# Patient Record
Sex: FEMALE | Race: WHITE
[De-identification: names, ages, dates, MRNs, and addresses within clinical notes are randomized per-mention and may not be internally consistent; named-entity substitution may affect disease eponyms.]

---

## 2018-07-10 ENCOUNTER — HOSPITAL ENCOUNTER (EMERGENCY)
Dept: HOSPITAL 75 - ER | Age: 58
Discharge: HOME | End: 2018-07-10
Payer: COMMERCIAL

## 2018-07-10 VITALS — BODY MASS INDEX: 29.92 KG/M2 | WEIGHT: 202 LBS | HEIGHT: 69 IN

## 2018-07-10 VITALS — DIASTOLIC BLOOD PRESSURE: 113 MMHG | SYSTOLIC BLOOD PRESSURE: 155 MMHG

## 2018-07-10 DIAGNOSIS — S90.02XA: Primary | ICD-10-CM
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
## 2018-07-10 PROCEDURE — 73610 X-RAY EXAM OF ANKLE: CPT

## 2018-07-10 NOTE — XMS REPORT
[facility]

 Created on: 10/19/2016



Evelin Rahman

External Reference #: [id number]

: 1960

Sex: Female



Demographics







 Address  [address]  [address]

 

 Home Phone  [phone]

 

 Preferred Language  Unknown

 

 Marital Status  Unknown

 

 Worship Affiliation  Unknown

 

 Race  White

 

 Ethnic Group  Not  or 





Author







 NIKKI Puga

 

 Organization  [employer]

 

 Address  Unknown

 

 Phone  Unavailable







Care Team Providers







 Care Team Member Name  Role  Phone

 

 NIKKI MAYERS  CP  Unavailable



                                                                



Allergies

          No Known Allergies                                                   
                                     



Problems

          





 Problem Type  Condition  Code  Onset Dates  Condition Status

 

 Problem  ADHD (attention deficit hyperactivity disorder)  F90.9     Active

 

 Problem  Anxiety  F41.9     Active

 

 Problem  Depression  F32.9     Active

 

 Problem  GERD (gastroesophageal reflux disease)  K21.9     Active

 

 Problem  Birth control  Z30.9     Active



                                                                               
                                                 



Medications

          No Known Medications                                                 
                             



Results

          No Known Results                                                     
               



Summary Purpose

          [employer] Submission

## 2018-07-10 NOTE — XMS REPORT
Continuity of Care Document

 Created on: 07/10/2018



MIKAEL SCHWARTZ

External Reference #: [id number]

: 1960

Sex: Female



Demographics







 Address  [address] [place] [address]

 

 Home Phone  [phone] x

 

 Preferred Language  Unknown

 

 Marital Status  Unknown

 

 Tenriism Affiliation  Unknown

 

 Race  Unknown

 

 Ethnic Group  Unknown





Author







 Author  [facility]

 

 Address  Unknown

 

 Phone  Unavailable



              



Allergies

      



There is no data.                  



Medications

      



There is no data.                  



Problems

      





 Date Dx Coded            Attending            Type            Code            
Diagnosis            Diagnosed By        

 

 2014            ELLIOTT GODFREY                         300.00 
           AN ANXIETY UNSPEC                     

 

 2014            ELLIOTT GODFREY                         311    
        DEPRESSIVE DISORDER NOS                     

 

 2014            ELLIOTT GODFREY                         314.00 
           ADHD INATTENTIVE                     

 

 2014            KANE SAEZ                         300.00  
          AN ANXIETY UNSPEC                     

 

 2014            KANE SAEZ                         311     
       DEPRESSIVE DISORDER NOS                     

 

 2014            KANE SAEZ                         314.00  
          ADHD INATTENTIVE                     

 

 2014            KANE SAEZ                         300.00  
          AN ANXIETY UNSPEC                     

 

 2014            KANE SAEZ                         311     
       DEPRESSIVE DISORDER NOS                     

 

 2014            KANE SAEZ                         314.00  
          ADHD INATTENTIVE                     

 

 2014            JACKELINE MEADOWS DDS                         300.00     
       AN ANXIETY UNSPEC                     

 

 2014            MEADOWS DDS, JACKELINE                         311        
    DEPRESSIVE DISORDER NOS                     

 

 2014            MEADOWS JANELLES, JACKELINE                         314.00     
       ADHD INATTENTIVE                     

 

 2014            FARRUKH HARP MAURO R                         300.00    
        AN ANXIETY UNSPEC                     

 

 2014            FARRUKH HARP MAURO R                         311       
     DEPRESSIVE DISORDER NOS                     

 

 2014            FARRUKH HARP MAURO R                         314.00    
        ADHD INATTENTIVE                     

 

 2014            KANE SAEZ                         296.33  
          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                
     

 

 2014            KANE SAEZ                         309.81  
          AN PTSD                     

 

 2014            KANE SAEZ                         296.33  
          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                
     

 

 2014            KANE SAEZ                         309.81  
          AN PTSD                     

 

 2014            JACKELINE MEADOWS DDS                         296.33     
       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                   
  

 

 2014            DORI PRESCOTTJACKELINE                         309.81     
       AN PTSD                     

 

 2014            MAURO SANTIAGO                         296.33    
        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                  
   

 

 2014            MAURO SANTIAGO                         309.81    
        AN PTSD                     

 

 2015            MAURO SANTIAGO                         530.81    
        GERD                     

 

 2015            MAURO SANTIAGO                         V70.0     
       ROUTINE GENERAL MEDICAL EXAMINATION AT A HEALTH CARE FACILITY           
          



                                                                  



Procedures

      



There is no data.                  



Results

      





 Test            Result            Range        









 PDM - AMPHETAMINES W/ REFLEX d/l ISOMERS - 18 11:20         









 Prescribed Drug 1            Adderall(TM)             NRG        

 

 COMMENT                         NRG        

 

 Amphetamine            2557 ng/mL            <250        

 

 medMATCH Amphetamine            CONSISTENT             NRG        

 

 Methamphetamine            NEGATIVE ng/mL            <250        

 

 medMATCH Methamphetamine            CONSISTENT             NRG        



                



Encounters

      





 ACCT No.            Visit Date/Time            Discharge            Status    
        Pt. Type            Provider            Facility            Loc./Unit  
          Complaint        

 

 [id number]            2015 09:53:00            2015 23:59:59          
  CLS            Outpatient            MAURO SANTIAGO                   
                            

 

 [id number]            2015 07:58:00            2015 23:59:59          
  CLS            Outpatient            DORI LUISJACKELINE STARKEY                    
                           

 

 [id number]            2014 11:48:00            2014 23:59:59          
  CLS            Outpatient            KANE SAEZ                 
                              

 

 [id number]            2014 11:48:00            2014 23:59:59          
  CLS            Outpatient            KANE SAEZ                 
                              

 

 [id number]            2014 13:45:00            2014 23:59:59          
  CLS            Outpatient            ELLIOTT GODFREY                
                               

 

 [id number]            2018 09:20:00            2018 23:59:59            
CLS            Outpatient            JAME VAIL LAC                         
[facility]                     

 

 [id number]            2018 10:40:00                                      
Document Registration                                                          
  

 

 [id number]            2017 23:44:33            2017 23:59:59            
CLS            Outpatient

## 2018-07-10 NOTE — XMS REPORT
[facility]

 Created on: 2017



Elaine Rahmana

External Reference #: [id number]

: 1960

Sex: Female



Demographics







 Address  [address]  [address]

 

 Preferred Language  Unknown

 

 Marital Status  Unknown

 

 Scientology Affiliation  Unknown

 

 Race  Unknown

 

 Ethnic Group  Unknown





Author







 Author  NIKKI MAYERS

 

 [facility]

 

 Address  [address]  [place]  [address]



 

 Phone  [phone]







Care Team Providers







 Care Team Member Name  Role  Phone

 

 NIKKI MAYERS  Unavailable  [phone]







PROBLEMS







 Type  Condition  ICD9-CM Code  ESC34-WA Code  Onset Dates  Condition Status  
SNOMED Code

 

 Problem  Depression     F32.9     Active  67457114

 

 Problem  ADHD (attention deficit hyperactivity disorder)     F90.9     Active  
679407313

 

 Problem  Birth control     Z30.9     Active  70879034

 

 Problem  Anxiety     F41.9     Active  87427342

 

 Problem  GERD (gastroesophageal reflux disease)     K21.9     Active  290635009







ALLERGIES

Unknown Allergies



SOCIAL HISTORY

No smoking Hx information available



PLAN OF CARE





VITAL SIGNS





MEDICATIONS

Unknown Medications



RESULTS

No Results



PROCEDURES

No Known procedures



IMMUNIZATIONS

No Known Immunizations

## 2018-07-10 NOTE — XMS REPORT
[facility]

 Created on: 2017



Elaine Rahmana

External Reference #: [id number]

: 1960

Sex: Female



Demographics







 Address  [address]  [address]

 

 Preferred Language  Unknown

 

 Marital Status  Unknown

 

 Religion Affiliation  Unknown

 

 Race  Unknown

 

 Ethnic Group  Unknown





Author







 Author  NIKKI MAYERS

 

 [facility]

 

 Address  [address]  [place]  [address]



 

 Phone  [phone]







Care Team Providers







 Care Team Member Name  Role  Phone

 

 NIKKI MAYERS  Unavailable  [phone]







PROBLEMS







 Type  Condition  ICD9-CM Code  PLF80-UO Code  Onset Dates  Condition Status  
SNOMED Code

 

 Problem  Depression     F32.9     Active  26119931

 

 Problem  ADHD (attention deficit hyperactivity disorder)     F90.9     Active  
151656922

 

 Problem  Birth control     Z30.9     Active  05632073

 

 Problem  Anxiety     F41.9     Active  88123913

 

 Problem  GERD (gastroesophageal reflux disease)     K21.9     Active  593079946







ALLERGIES

Unknown Allergies



SOCIAL HISTORY

No smoking Hx information available



PLAN OF CARE





VITAL SIGNS





MEDICATIONS

Unknown Medications



RESULTS

No Results



PROCEDURES

No Known procedures



IMMUNIZATIONS

No Known Immunizations

## 2018-07-10 NOTE — XMS REPORT
[facility]

 Created on: 2017



Evelin Rahman

External Reference #: [id number]

: 1960

Sex: Female



Demographics







 Address  [address]  [address]

 

 Preferred Language  Unknown

 

 Marital Status  Unknown

 

 Buddhist Affiliation  Unknown

 

 Race  Unknown

 

 Ethnic Group  Unknown





Author







 Author  KANE THOMPSON

 

 Organization  [facility]

 

 Address  [address] [place]  [address]



 

 Phone  [phone]







Care Team Providers







 Care Team Member Name  Role  Phone

 

 KANE THOMPSON  Unavailable  [phone]







PROBLEMS







 Type  Condition  ICD9-CM Code  SDW91-JW Code  Onset Dates  Condition Status  
SNOMED Code

 

 Problem  ADHD (attention deficit hyperactivity disorder)     F90.9     Active  
532162696

 

 Problem  GERD (gastroesophageal reflux disease)     K21.9     Active  004136132

 

 Problem  Depression     F32.9     Active  37377147

 

 Problem  Birth control     Z30.9     Active  73760642

 

 Problem  SUMAYA (generalized anxiety disorder)     F41.1     Active  96840937

 

 Problem  Chronic post-traumatic stress disorder (PTSD)     F43.12     Active  
107536011

 

 Problem  PTSD (post-traumatic stress disorder)     F43.10     Active  07409158

 

 Problem  Anxiety     F41.9     Active  44158111

 

 Problem  Recurrent major depressive disorder, in partial remission     F33.41 
    Active  43208505

 

 Problem  Major depression, chronic     F32.9     Active  991754230







ALLERGIES

No Information



SOCIAL HISTORY

Never Assessed



PLAN OF CARE





VITAL SIGNS





MEDICATIONS







 Medication  Instructions  Dosage  Frequency  Start Date  End Date  Duration  
Status

 

 Adderall XR 10 mg  Orally Once at 2pm for ADHD  1 capsule     28 Mar, 2017     
28 days  Active

 

 Trazodone HCl 300 MG  Orally Once a day  1 tablet at bedtime  24h  27 Mar, 
2017     30 day(s)  Active

 

 BusPIRone HCl 15 MG  Orally Twice a day  1 tablet  12h  27 Mar, 2017     30 
days  Active

 

 Adderall XR 20 mg  Orally Once a day for ADHD  1 Capsule     28 Mar, 2017     
28 days  Active







RESULTS

No Results



PROCEDURES

No Known procedures



IMMUNIZATIONS

No Known Immunizations



MEDICAL (GENERAL) HISTORY







 Type  Description  Date

 

 Medical History  gastrointestinal disorder Hiatal Hernia   

 

 Medical History  Psychiatric disorders Depression , anxiety   

 

 Medical History  ADHD   

 

 Medical History  Cervical CA-with cone biopsy    

 

 Surgical History  sinus  surgery  

 

 Hospitalization History  Childhood panic - hospitalized for heart eval

## 2018-07-10 NOTE — XMS REPORT
[facility]

 Created on: 2018



Evelin Rahman

External Reference #: [id number]

: 1960

Sex: Female



Demographics







 Address  [address] [place] [place]  [address]

 

 Preferred Language  Unknown

 

 Marital Status  Unknown

 

 Nondenominational Affiliation  Unknown

 

 Race  Unknown

 

 Ethnic Group  Unknown





Author







 Author  KANE THOMPSON

 

 [facility]

 

 Address  [address] [place]  [address]



 

 Phone  [phone]







Care Team Providers







 Care Team Member Name  Role  Phone

 

 KANE THOMPSON  Unavailable  [phone]







PROBLEMS







 Type  Condition  ICD9-CM Code  TVZ61-OT Code  Onset Dates  Condition Status  
SNOMED Code

 

 Problem  GERD (gastroesophageal reflux disease)     K21.9     Active  065562009

 

 Problem  PTSD (post-traumatic stress disorder)     F43.10     Active  46923198

 

 Problem  Anxiety     F41.9     Active  88173372

 

 Problem  Birth control     Z30.9     Active  40689220

 

 Problem  ADHD (attention deficit hyperactivity disorder)     F90.9     Active  
132306409

 

 Problem  Depression     F32.9     Active  07652666

 

 Problem  History of fatty infiltration of liver     Z87.19     Active  
089154429

 

 Problem  ADHD (attention deficit hyperactivity disorder), inattentive type     
F90.0     Active  57397803

 

 Problem  Recurrent major depressive disorder, in partial remission     F33.41 
    Active  85327300

 

 Problem  Major depression, chronic     F32.9     Active  162391929

 

 Problem  Chronic post-traumatic stress disorder (PTSD)     F43.12     Active  
378882533

 

 Problem  SUMAYA (generalized anxiety disorder)     F41.1     Active  36005528







ALLERGIES

No Known Allergies



ENCOUNTERS







 Encounter  Location  Date  Diagnosis

 

 [facility]  [id number] [address]     

 

 [facility]  [id number], [place] [address]
7791     

 

 [facility]  [address] [place] [address]
3440  22 Mar, 2018   

 

 [facility]  [address] [place] [address]-
6384  08 Mar, 2018  GERD (gastroesophageal reflux disease) K21.9 ; History of 
fatty infiltration of liver Z87.19 ; Family history of heart disease Z82.49 ; 
Routine adult health maintenance Z00.00 and Encounter for screening for 
malignant neoplasm of colon Z12.11

 

 [facility]  [id number] [address]
8256  07 Mar, 2018  Acute nasopharyngitis J00

 

 [facility]  [id number] [place] [address]
3666  28 2018   

 

 [facility]  [address] [address]     

 

 [facility]  [id number] [address]
3480    Recurrent major depressive disorder, in partial remission 
F33.41 ; ADHD (attention deficit hyperactivity disorder), inattentive type 
F90.0 and PTSD (post-traumatic stress disorder) F43.10

 

 [facility]  [id number] [address]
0996  05 Dec, 2017   

 

 [facility]  [address] [address]
12[address]     

 

 [facility]  [id number] [address]  12 Oct, 2017  Recurrent major depressive disorder, in partial remission 
F33.41 ; PTSD (post-traumatic stress disorder) F43.10 and ADHD (attention 
deficit hyperactivity disorder), inattentive type F90.0

 

 [facility]  [id number] [address]-
1708  13 Sep, 2017   

 

 [facility]  [id number] [address]
8607  11 Aug, 2017   

 

 [facility]  [id number] [address]676  14 2017   

 

 [facility]  [id number] [address]     

 

 [facility]  [id number] [address]
4646    ADHD (attention deficit hyperactivity disorder) F90.9

 

 [facility]  [address] [address]
4304    Recurrent major depressive disorder, in partial remission 
F33.41 ; SUMAYA (generalized anxiety disorder) F41.1 ; Chronic post-traumatic 
stress disorder (PTSD) F43.12 and ADHD (attention deficit hyperactivity disorder
) F90.9

 

 [facility]  [address] [place] [address]
5[address]  24 May, 2017   

 

 [facility]  [id number] [place] [address]
3476  19 May, 2017   

 

 [facility]  [id number] [place] [address]-
[address]     

 

 [facility]  [id number] [place] [address]-
9956  30 Mar, 2017   

 

 [facility]  [id number] [place] [address]-
7446  28 Mar, 2017   

 

 [facility]  [id number] [place] [address]-
5408  23 Mar, 2017   

 

 [facility]  [address] [place] [address]-
1830     

 

 [facility]  [id number] [address]-
7766     

 

 [facility]  [address] [address]-
5176    Recurrent major depressive disorder, in partial remission 
F33.41 ; PTSD (post-traumatic stress disorder) F43.10 and ADHD (attention 
deficit hyperactivity disorder) F90.9

 

 [facility]  [address] [place] [address]-
3656  31 Oct, 2016   

 

 [facility]  [id number] [place] [address]-
0956  31 Oct, 2016   

 

 [facility]  [id number] [address]-
4443  27 Oct, 2016   

 

 [facility]  [address] [address]-
6880  25 Oct, 2016   

 

 [facility]  [address] [address]-
8827  17 Oct, 2016   

 

 [facility]  [address] [address]-
5026  08 Sep, 2016  Bronchitis J40

 

 [facility]  [id number] [place] [address]-
7933  08 Sep, 2016   

 

 [facility]  [address] [address]-
5432  08 Sep, 2016   

 

 [facility]  [address] [place] [address]-
1327  12 May, 2016   

 

 [facility]  [address] [address]-
1029  04 May, 2016  GERD (gastroesophageal reflux disease) K21.9 and Birth 
control Z30.9

 

 [facility]  [address] [address]
5398    Routine gynecological examination V72.31 ; Pap test, as 
part of routine gynecological examination V76.2 ; Screen for STD (sexually 
transmitted disease) V74.5 ; Breast cancer screening V76.10 ; Colon cancer 
screening V76.51 and Oral contraceptive pill surveillance V25.41

 

 [facility]  [address] [address]-
1864     

 

 [facility]  [address] [address]-
7920     

 

 [facility]  [address] [address]-
6492  30 Dec, 2014   

 

 [facility]  [address] [address]-
6305  30 Dec, 2014   

 

 [facility]  [address] [address]-
6371     

 

 [facility]  [id number] [address]-
8747     

 

 [facility]  [address] [address]-
[address]     

 

 [facility]  [address] [address]-
2449     

 

 [facility]  [address] [address]-
3466     

 

 [facility]  [address] [address]-
5976     

 

 [facility]  [address] [address]-
4163  30 Sep, 2014   

 

 [facility]  [address] [address]-
4920  30 Sep, 2014   







IMMUNIZATIONS

No Known Immunizations



SOCIAL HISTORY

Never Assessed



REASON FOR VISIT

 f/isaias Bhardwaj MA



PLAN OF CARE







 Activity  Details









  









 Follow Up  4 Months Reason:







VITAL SIGNS







 Height  69 in  2017

 

 Weight  196.1 lbs  2017

 

 Heart Rate  88 bpm  2017

 

 Respiratory Rate  20   2017

 

 BMI  28.96 kg/m2  2017

 

 Blood pressure systolic  170 mmHg  2017

 

 Blood pressure diastolic  91 mmHg  2017







MEDICATIONS







 Medication  Instructions  Dosage  Frequency  Start Date  End Date  Duration  
Status

 

 Fluoxetine 40 mg  by oral route each morning  2 Capsule           
  Active

 

 Trazodone HCl 300 MG  Orally Once a day  1 tablet at bedtime  24h  27 Mar, 
2017        Active

 

 Strattera 100 mg  Orally Once each morning  1 capsule     30 Sep, 2014        
Active

 

 Adderall XR 10 mg  Orally Once at 2pm for ADHD  1 capsule     25 May, 2017    
    Active

 

 BusPIRone HCl 15 mg  Orally Twice a day  1 tablet  12h  27 Mar, 2017        
Active

 

 Effexor  MG  Orally Once a day  2 capsule with food  24h  30 Mar, 2017  
      Active

 

 Adderall XR 20 mg  Orally Once a day for ADHD  1 Capsule     25 May, 2017     
   Active

 

 Junel FE  1 mg-20 mcg  Orally Once a day  TAKE ONE TABLET BY MOUTH DAILY  
24h        28  Active

 

 Nexium 40 mg  Orally Once a day  1 capsule  24h  04 May, 2016     30 day(s)  
Active







RESULTS

No Results



PROCEDURES

No Known procedures



INSTRUCTIONS





MEDICATIONS ADMINISTERED

No Known Medications



MEDICAL (GENERAL) HISTORY







 Type  Description  Date

 

 Medical History  gastrointestinal disorder Hiatal Hernia   

 

 Medical History  Psychiatric disorders Depression , anxiety   

 

 Medical History  ADHD   

 

 Medical History  Cervical CA-with cone biopsy    

 

 Surgical History  sinus  surgery  

 

 Hospitalization History  Childhood panic - hospitalized for heart eval  1971

## 2018-07-10 NOTE — XMS REPORT
[facility]

 Created on: 2018



Evelin Rahman

External Reference #: [id number]

: 1960

Sex: Female



Demographics







 Address  [address] [place]  [address]

 

 Preferred Language  Unknown

 

 Marital Status  Unknown

 

 Taoist Affiliation  Unknown

 

 Race  Unknown

 

 Ethnic Group  Unknown





Author







 Author  KANE THOMPSON

 

 [facility]

 

 Address  [address] [place]  [address]



 

 Phone  [phone]







Care Team Providers







 Care Team Member Name  Role  Phone

 

 KANE THOMPSON  Unavailable  [phone]







PROBLEMS







 Type  Condition  ICD9-CM Code  KYU20-LL Code  Onset Dates  Condition Status  
SNOMED Code

 

 Problem  GERD (gastroesophageal reflux disease)     K21.9     Active  749698664

 

 Problem  PTSD (post-traumatic stress disorder)     F43.10     Active  50147309

 

 Problem  Anxiety     F41.9     Active  76685595

 

 Problem  Birth control     Z30.9     Active  62904153

 

 Problem  ADHD (attention deficit hyperactivity disorder)     F90.9     Active  
468855386

 

 Problem  Depression     F32.9     Active  16950442

 

 Problem  History of fatty infiltration of liver     Z87.19     Active  
163564586

 

 Problem  ADHD (attention deficit hyperactivity disorder), inattentive type     
F90.0     Active  47135697

 

 Problem  Recurrent major depressive disorder, in partial remission     F33.41 
    Active  75137541

 

 Problem  Major depression, chronic     F32.9     Active  428425599

 

 Problem  Chronic post-traumatic stress disorder (PTSD)     F43.12     Active  
663804045

 

 Problem  SUMAYA (generalized anxiety disorder)     F41.1     Active  42903173







ALLERGIES

No Information



ENCOUNTERS







 Encounter  Location  Date  Diagnosis

 

 [facility]  [id number], [place] [address]
6783     

 

 [facility]  [address], [place] [address]
0684     

 

 [facility]  [address] [place] [address]-
6780  22 Mar, 2018   

 

 [facility]  [address] [place] [address]-
0426  08 Mar, 2018  GERD (gastroesophageal reflux disease) K21.9 ; History of 
fatty infiltration of liver Z87.19 ; Family history of heart disease Z82.49 ; 
Routine adult health maintenance Z00.00 and Encounter for screening for 
malignant neoplasm of colon Z12.11

 

 [facility]  [address], [place] [address]
8042  07 Mar, 2018  Acute nasopharyngitis J00

 

 [facility]  [id number], [place] [address]
0336  28 2018   

 

 [facility]  [address], [place] [address]
4473     

 

 [facility]  [address] [address]
7651    Recurrent major depressive disorder, in partial remission 
F33.41 ; ADHD (attention deficit hyperactivity disorder), inattentive type 
F90.0 and PTSD (post-traumatic stress disorder) F43.10

 

 [facility]  [address], [place] [address]
4386  05 Dec, 2017   

 

 [facility]  [id number] [address]
7518     

 

 [facility]  [address] [address]
1110  12 Oct, 2017  Recurrent major depressive disorder, in partial remission 
F33.41 ; PTSD (post-traumatic stress disorder) F43.10 and ADHD (attention 
deficit hyperactivity disorder), inattentive type F90.0

 

 [facility]  [address] [address]-
4099  13 Sep, 2017   

 

 [facility]  [address] [address]-
4333  11 Aug, 2017   

 

 [facility]  [id number] [address]-
2190  14 2017   

 

 [facility]  [address] [address]-
2626  16 2017   

 

 [facility]  [id number] [address]-
4646    ADHD (attention deficit hyperactivity disorder) F90.9

 

 [facility]  [address] [address]
5480    Recurrent major depressive disorder, in partial remission 
F33.41 ; SUMAYA (generalized anxiety disorder) F41.1 ; Chronic post-traumatic 
stress disorder (PTSD) F43.12 and ADHD (attention deficit hyperactivity disorder
) F90.9

 

 [facility]  [address] [place] [facility] [address]-
7456  24 May, 2017   

 

 [facility]  [id number] [place] [address]-
9536  19 May, 2017   

 

 [facility]  [id number] [place] [address], [place] [address]-
5[address]     

 

 [facility]  [id number] [place] [address], [place] [address]-
8056  30 Mar, 2017   

 

 [facility]  [id number] [place] [address]-
8553  28 Mar, 2017   

 

 [facility]  [id number] [address]-
7694  23 Mar, 2017   

 

 [facility]  [id number] [place] [address], [place] [address]-
0691     

 

 [facility]  [id number] [address]-
4493     

 

 [facility]  [id number] [address]-
7441    Recurrent major depressive disorder, in partial remission 
F33.41 ; PTSD (post-traumatic stress disorder) F43.10 and ADHD (attention 
deficit hyperactivity disorder) F90.9

 

 [facility]  [id number] [address]-
6966  31 Oct, 2016   

 

 [facility]  [id number] [place] [address]-
6716  31 Oct, 2016   

 

 [facility]  [id number] [address]-
1164  27 Oct, 2016   

 

 [facility]  [address], [place] [address]-
6537  25 Oct, 2016   

 

 [facility]  [address] [address]-
0377  17 Oct, 2016   

 

 [facility]  [address], [place] [address]-
2266  08 Sep, 2016  Bronchitis J40

 

 [facility]  [id number] [place] [address]-
2186  08 Sep, 2016   

 

 [facility]  [address] [place] [address]-
1848  08 Sep, 2016   

 

 [facility]  [address] [place] [address]-
2581  12 May, 2016   

 

 [facility]  [address] [address]-
9941  04 May, 2016  GERD (gastroesophageal reflux disease) K21.9 and Birth 
control Z30.9

 

 [facility]  [address], [place] [address]-
0077    Routine gynecological examination V72.31 ; Pap test, as 
part of routine gynecological examination V76.2 ; Screen for STD (sexually 
transmitted disease) V74.5 ; Breast cancer screening V76.10 ; Colon cancer 
screening V76.51 and Oral contraceptive pill surveillance V25.41

 

 [facility]  [address], [place] [address]-
2214     

 

 [facility]  [address], [place] [address]-
3058     

 

 [facility]  [address] [address]-
6302  30 Dec, 2014   

 

 [facility]  [address] [address]-
9565  30 Dec, 2014   

 

 [facility]  [address] [address]-
5130     

 

 [facility]  [id number], [place] [address]-
4729     

 

 [facility]  [address], [place] [address]-
8838     

 

 [facility]  [address], [place] [address]-
5636     

 

 [facility]  [address] [address]-
1682     

 

 [facility]  [address], [place] [address]-
1484     

 

 [facility]  [address], [place] [address]-
1415  30 Sep, 2014   

 

 [facility]  [address], [place] [address]-
3196  30 Sep, 2014   







IMMUNIZATIONS

No Known Immunizations



SOCIAL HISTORY

Never Assessed



REASON FOR VISIT

adderall xr 20 mg 2017



PLAN OF CARE





VITAL SIGNS





MEDICATIONS







 Medication  Instructions  Dosage  Frequency  Start Date  End Date  Duration  
Status

 

 Adderall XR 20 mg  Orally Once a day for ADHD  1 Capsule          
   Active







RESULTS

No Results



PROCEDURES

No Known procedures



INSTRUCTIONS





MEDICATIONS ADMINISTERED

No Known Medications



MEDICAL (GENERAL) HISTORY







 Type  Description  Date

 

 Medical History  gastrointestinal disorder Hiatal Hernia   

 

 Medical History  Psychiatric disorders Depression , anxiety   

 

 Medical History  ADHD   

 

 Medical History  Cervical CA-with cone biopsy    

 

 Surgical History  sinus  surgery  

 

 Hospitalization History  Childhood panic - hospitalized for heart eval  1971

## 2018-07-10 NOTE — XMS REPORT
[facility]

 Created on: 2017



Evelin Rahman

External Reference #: [id number]

: 1960

Sex: Female



Demographics







 Address  [address]  [address]

 

 Preferred Language  Unknown

 

 Marital Status  Unknown

 

 Religion Affiliation  Unknown

 

 Race  Unknown

 

 Ethnic Group  Unknown





Author







 Author  KANE THOMPSON

 

 Organization  [facility]

 

 Address  [address] [place]  [address]



 

 Phone  [phone]







Care Team Providers







 Care Team Member Name  Role  Phone

 

 KANE THOMPSON  Unavailable  [phone]







PROBLEMS







 Type  Condition  ICD9-CM Code  LNU02-KS Code  Onset Dates  Condition Status  
SNOMED Code

 

 Problem  Depression     F32.9     Active  73296662

 

 Problem  Anxiety     F41.9     Active  92610997

 

 Problem  GERD (gastroesophageal reflux disease)     K21.9     Active  053303850

 

 Problem  Birth control     Z30.9     Active  41641074

 

 Problem  ADHD (attention deficit hyperactivity disorder)     F90.9     Active  
387207784

 

 Problem  ADHD (attention deficit hyperactivity disorder), inattentive type     
F90.0     Active  74468021

 

 Problem  Chronic post-traumatic stress disorder (PTSD)     F43.12     Active  
670988654

 

 Problem  Major depression, chronic     F32.9     Active  087214467

 

 Problem  PTSD (post-traumatic stress disorder)     F43.10     Active  12136767

 

 Problem  SUMAYA (generalized anxiety disorder)     F41.1     Active  92808035

 

 Problem  Recurrent major depressive disorder, in partial remission     F33.41 
    Active  11909927







ALLERGIES

No Information



SOCIAL HISTORY

Never Assessed



PLAN OF CARE





VITAL SIGNS





MEDICATIONS







 Medication  Instructions  Dosage  Frequency  Start Date  End Date  Duration  
Status

 

 Adderall XR 20 mg  Orally Once a day for ADHD  1 Capsule     25 May, 2017     
28 days  Active

 

 Adderall XR 10 mg  Orally Once at 2pm for ADHD  1 capsule     25 May, 2017     
28 days  Active







RESULTS

No Results



PROCEDURES

No Known procedures



IMMUNIZATIONS

No Known Immunizations



MEDICAL (GENERAL) HISTORY







 Type  Description  Date

 

 Medical History  gastrointestinal disorder Hiatal Hernia   

 

 Medical History  Psychiatric disorders Depression , anxiety   

 

 Medical History  ADHD   

 

 Medical History  Cervical CA-with cone biopsy    

 

 Surgical History  sinus  surgery  

 

 Hospitalization History  Childhood panic - hospitalized for heart eval  1971

## 2018-07-10 NOTE — XMS REPORT
[facility]

 Created on: 2018



Evelin Rahman

External Reference #: [id number]

: 1960

Sex: Female



Demographics







 Address  [address] [place]  [address]

 

 Preferred Language  Unknown

 

 Marital Status  Unknown

 

 Mu-ism Affiliation  Unknown

 

 Race  Unknown

 

 Ethnic Group  Unknown





Author







 Author  KANE THOMPSON

 

 [facility]

 

 Address  [address] [place]  [address]



 

 Phone  [phone]







Care Team Providers







 Care Team Member Name  Role  Phone

 

 KANE THOMPSON  Unavailable  [phone]







PROBLEMS







 Type  Condition  ICD9-CM Code  UHN85-KB Code  Onset Dates  Condition Status  
SNOMED Code

 

 Problem  Anxiety     F41.9     Active  47218846

 

 Problem  Major depression, chronic     F32.9     Active  293869436

 

 Problem  PTSD (post-traumatic stress disorder)     F43.10     Active  62830872

 

 Problem  Birth control     Z30.9     Active  03999884

 

 Problem  ADHD (attention deficit hyperactivity disorder)     F90.9     Active  
652612081

 

 Problem  Depression     F32.9     Active  88878082

 

 Problem  GERD (gastroesophageal reflux disease)     K21.9     Active  974050725

 

 Problem  History of fatty infiltration of liver     Z87.19     Active  
459462339

 

 Problem  Encounter for drug screening     Z02.83     Active  633578421

 

 Problem  SUMAYA (generalized anxiety disorder)     F41.1     Active  84112894

 

 Problem  Recurrent major depressive disorder, in partial remission     F33.41 
    Active  80481271

 

 Problem  ADHD (attention deficit hyperactivity disorder), inattentive type     
F90.0     Active  13055840

 

 Problem  Chronic post-traumatic stress disorder (PTSD)     F43.12     Active  
948192239







ALLERGIES

No Information



ENCOUNTERS







 Encounter  Location  Date  Diagnosis

 

 [facility]  [address], [place] [address]-
1426  02 Aug, 2018   

 

 [facility]  [address], [place] [address]
6209  25 May, 2018   

 

 [facility]  [address] [place] [address]-
9012     

 

 [facility]  [address], [place] [address]-
9351    Recurrent major depressive disorder, in partial remission 
F33.41 ; Encounter for drug screening Z02.83 ; ADHD (attention deficit 
hyperactivity disorder), inattentive type F90.0 ; Chronic post-traumatic stress 
disorder (PTSD) F43.12 ; Family history of heart disease Z82.49 and History of 
fatty infiltration of liver Z87.19

 

 [id number] [place] [address]-
9249  22 Mar, 2018  GERD (gastroesophageal reflux disease) K21.9

 

 [id number] [place] [address]-
1958  08 Mar, 2018  GERD (gastroesophageal reflux disease) K21.9 ; History of 
fatty infiltration of liver Z87.19 ; Family history of heart disease Z82.49 ; 
Routine adult health maintenance Z00.00 and Encounter for screening for 
malignant neoplasm of colon Z12.11

 

 [id number] [place] [address]-
4699  07 Mar, 2018  Acute nasopharyngitis J00

 

 [id number] [place] [address]-
4719  28 2018   

 

 [id number] [place] [address]-
1433     

 

 [address] [address]-
0830    Recurrent major depressive disorder, in partial remission 
F33.41 ; ADHD (attention deficit hyperactivity disorder), inattentive type 
F90.0 and PTSD (post-traumatic stress disorder) F43.10

 

 [id number] [place] [address]-
1756  05 Dec, 2017   

 

 [id number] [place] [address]-
2057     

 

 [id number] [place] [address]-
4379  12 Oct, 2017  Recurrent major depressive disorder, in partial remission 
F33.41 ; PTSD (post-traumatic stress disorder) F43.10 and ADHD (attention 
deficit hyperactivity disorder), inattentive type F90.0

 

 [address] [place] [address]-
0375  13 Sep, 2017   

 

 [facility]  [address], [place] [address]-
4351  11 Aug, 2017   

 

 [facility]  [address], [place] [address]-
2566     

 

 [facility]  [address], [place] [address]-
2776     

 

 [facility]  [address], [place] [address]-
7053    ADHD (attention deficit hyperactivity disorder) F90.9

 

 [facility]  [address] [place] [address]-
0612    Recurrent major depressive disorder, in partial remission 
F33.41 ; SUMAYA (generalized anxiety disorder) F41.1 ; Chronic post-traumatic 
stress disorder (PTSD) F43.12 and ADHD (attention deficit hyperactivity disorder
) F90.9

 

 [facility]  [address], [place] [address]-
6787  24 May, 2017   

 

 [facility]  [address] [address]-
9240  19 May, 2017   

 

 [facility]  [address], [place] [address]-
5655     

 

 [facility]  [address], [place] [address]-
4644  30 Mar, 2017   

 

 [facility]  [address], [place] [address]-
9074  28 Mar, 2017   

 

 [facility]  [address] [address]-
9420  23 Mar, 2017   

 

 [facility]  [address] [address]-
2071     

 

 [facility]  [address] [address]-
4676     

 

 [facility]  [address] [address]-
1359   2017  Recurrent major depressive disorder, in partial remission 
F33.41 ; PTSD (post-traumatic stress disorder) F43.10 and ADHD (attention 
deficit hyperactivity disorder) F90.9

 

 [facility]  [address], [place] [address]-
0256  31 Oct, 2016   

 

 [facility]  [id number] [place] [address]-
3632  31 Oct, 2016   

 

 [facility]  [id number] [place] [address]-
9739  27 Oct, 2016   

 

 [facility]  [id number] [place] [address]-
9994  25 Oct, 2016   

 

 [facility]  [id number] [place] [address]-
5569  17 Oct, 2016   

 

 [facility]  [id number] [place] [address]-
3178  08 Sep, 2016  Bronchitis J40

 

 [facility]  [id number] [place] [address]-
9319  08 Sep, 2016   

 

 [facility]  [id number] [place] [address]-
4556  08 Sep, 2016   

 

 [facility]  [address] [place] [address]-
1577  12 May, 2016   

 

 [facility]  [id number] [place] [address]-
4883  04 May, 2016  GERD (gastroesophageal reflux disease) K21.9 and Birth 
control Z30.9

 

 [facility]  [id number] [place] [address]-
4876    Routine gynecological examination V72.31 ; Pap test, as 
part of routine gynecological examination V76.2 ; Screen for STD (sexually 
transmitted disease) V74.5 ; Breast cancer screening V76.10 ; Colon cancer 
screening V76.51 and Oral contraceptive pill surveillance V25.41

 

 [address], [place] [address]-
6510     

 

 [facility]  [address], [place] [address]-
4046     

 

 [facility]  [address], [place] [address]-
5433  30 Dec, 2014   

 

 [facility]  [address] [place] [address]-
4440  30 Dec, 2014   

 

 [facility]  [address], [place] [address]-
7316     

 

 [facility]  [address] [place] [address]-
1912     

 

 [facility]  [address], [place] [address]-
5382     

 

 [facility]  [address], [place] [address]-
3466     

 

 [facility]  [address], [place] [address]-
4827     

 

 [facility]  [address], [place] [address]-
8217     

 

 [facility]  [address], [place] [address]-
0872  30 Sep, 2014   

 

 [facility]  [address], [place] [address]-
5670  30 Sep, 2014   







IMMUNIZATIONS

No Known Immunizations



SOCIAL HISTORY

Never Assessed



REASON FOR VISIT

adderall 2017



PLAN OF CARE





VITAL SIGNS





MEDICATIONS







 Medication  Instructions  Dosage  Frequency  Start Date  End Date  Duration  
Status

 

 Adderall XR 20 mg  Orally Once a day for ADHD  1 Capsule     07 Dec, 2017     
28 days  Active

 

 Adderall XR 10 mg  Orally Once at 2pm for ADHD  1 capsule     07 Dec, 2017     
28 days  Active







RESULTS

No Results



PROCEDURES

No Known procedures



INSTRUCTIONS





MEDICATIONS ADMINISTERED

No Known Medications



MEDICAL (GENERAL) HISTORY







 Type  Description  Date

 

 Medical History  gastrointestinal disorder Hiatal Hernia   

 

 Medical History  Psychiatric disorders Depression , anxiety   

 

 Medical History  ADHD   

 

 Medical History  Cervical CA-with cone biopsy 1988   

 

 Surgical History  sinus  surgery  

 

 Hospitalization History  Childhood panic - hospitalized for heart eval  1971

## 2018-07-10 NOTE — XMS REPORT
[facility]

 Created on: 2017



Evelin Rahman

External Reference #: [id number]

: 1960

Sex: Female



Demographics







 Address  [address]  [address]

 

 Preferred Language  Unknown

 

 Marital Status  Unknown

 

 Cheondoism Affiliation  Unknown

 

 Race  Unknown

 

 Ethnic Group  Unknown





Author







 Author  KANE THOMPSON

 

 Organization  [facility]

 

 Address  [address] [place]  [address]



 

 Phone  [phone]







Care Team Providers







 Care Team Member Name  Role  Phone

 

 KANE THOMPSON  Unavailable  [phone]







PROBLEMS







 Type  Condition  ICD9-CM Code  JMZ80-AJ Code  Onset Dates  Condition Status  
SNOMED Code

 

 Problem  Depression     F32.9     Active  43321240

 

 Problem  Anxiety     F41.9     Active  48466849

 

 Problem  GERD (gastroesophageal reflux disease)     K21.9     Active  610577253

 

 Problem  Birth control     Z30.9     Active  53000238

 

 Problem  ADHD (attention deficit hyperactivity disorder)     F90.9     Active  
091938644

 

 Problem  ADHD (attention deficit hyperactivity disorder), inattentive type     
F90.0     Active  47635862

 

 Problem  Chronic post-traumatic stress disorder (PTSD)     F43.12     Active  
732252261

 

 Problem  Major depression, chronic     F32.9     Active  577594283

 

 Problem  PTSD (post-traumatic stress disorder)     F43.10     Active  28346254

 

 Problem  SUMAYA (generalized anxiety disorder)     F41.1     Active  24580294

 

 Problem  Recurrent major depressive disorder, in partial remission     F33.41 
    Active  43630997







ALLERGIES

No Information



SOCIAL HISTORY

Never Assessed



PLAN OF CARE





VITAL SIGNS





MEDICATIONS

Unknown Medications



RESULTS

No Results



PROCEDURES

No Known procedures



IMMUNIZATIONS

No Known Immunizations



MEDICAL (GENERAL) HISTORY







 Type  Description  Date

 

 Medical History  gastrointestinal disorder Hiatal Hernia   

 

 Medical History  Psychiatric disorders Depression , anxiety   

 

 Medical History  ADHD   

 

 Medical History  Cervical CA-with cone biopsy    

 

 Surgical History  sinus  surgery  

 

 Hospitalization History  Childhood panic - hospitalized for heart eval  1971

## 2018-07-10 NOTE — XMS REPORT
[facility]

 Created on: 2018



Evelin Rahman

External Reference #: [id number]

: 1960

Sex: Female



Demographics







 Address  [address] [place]  [address]

 

 Preferred Language  Unknown

 

 Marital Status  Unknown

 

 Jain Affiliation  Unknown

 

 Race  Unknown

 

 Ethnic Group  Unknown





Author







 Author  KANE THOMPSON

 

 [facility]

 

 Address  [address] [place]  [address]



 

 Phone  [phone]







Care Team Providers







 Care Team Member Name  Role  Phone

 

 KANE THOMPSON  Unavailable  [phone]







PROBLEMS







 Type  Condition  ICD9-CM Code  VUR47-KP Code  Onset Dates  Condition Status  
SNOMED Code

 

 Problem  Anxiety     F41.9     Active  81901342

 

 Problem  Major depression, chronic     F32.9     Active  185406731

 

 Problem  PTSD (post-traumatic stress disorder)     F43.10     Active  16453277

 

 Problem  Birth control     Z30.9     Active  45265996

 

 Problem  ADHD (attention deficit hyperactivity disorder)     F90.9     Active  
667474059

 

 Problem  Depression     F32.9     Active  71895742

 

 Problem  GERD (gastroesophageal reflux disease)     K21.9     Active  081407563

 

 Problem  History of fatty infiltration of liver     Z87.19     Active  
840909475

 

 Problem  Encounter for drug screening     Z02.83     Active  708580663

 

 Problem  SUMAYA (generalized anxiety disorder)     F41.1     Active  08845174

 

 Problem  Recurrent major depressive disorder, in partial remission     F33.41 
    Active  69534377

 

 Problem  ADHD (attention deficit hyperactivity disorder), inattentive type     
F90.0     Active  63483116

 

 Problem  Chronic post-traumatic stress disorder (PTSD)     F43.12     Active  
394470481







ALLERGIES

No Known Allergies



ENCOUNTERS







 Encounter  Location  Date  Diagnosis

 

 [facility]  [id number], [place] [address]
0775  02 Aug, 2018   

 

 [facility]  [address], [place] [address]
3068     

 

 [facility]  [address], [place] [address]
5462     

 

 [facility]  [address], [place] [address]
7955     

 

 [facility]  [address], [place] [address]
6801  25 May, 2018   

 

 [address], [place] [address]
0866     

 

 [id number] [place] [address]-
8503    Recurrent major depressive disorder, in partial remission 
F33.41 ; Encounter for drug screening Z02.83 ; ADHD (attention deficit 
hyperactivity disorder), inattentive type F90.0 ; Chronic post-traumatic stress 
disorder (PTSD) F43.12 ; Family history of heart disease Z82.49 and History of 
fatty infiltration of liver Z87.19

 

 [id number] [place] [address]-
6656  22 Mar, 2018  GERD (gastroesophageal reflux disease) K21.9

 

 [id number] [place] [address] 48893-
0965  08 Mar, 2018  GERD (gastroesophageal reflux disease) K21.9 ; History of 
fatty infiltration of liver Z87.19 ; Family history of heart disease Z82.49 ; 
Routine adult health maintenance Z00.00 and Encounter for screening for 
malignant neoplasm of colon Z12.11

 

 [id number] [place] [address]-
5561  07 Mar, 2018  Acute nasopharyngitis J00

 

 [address] [place] [address] 52656-
6567     

 

 [id number] [place] [address] 32346-
6210     

 

 [address] [place] [address] 67495-
7482    Recurrent major depressive disorder, in partial remission 
F33.41 ; ADHD (attention deficit hyperactivity disorder), inattentive type 
F90.0 and PTSD (post-traumatic stress disorder) F43.10

 

 [address], [place] [address]-
9190  05 Dec, 2017   

 

 [id number] [place] [address]697-
4949     

 

 [facility]  [address], [place] [address]-
7907  12 Oct, 2017  Recurrent major depressive disorder, in partial remission 
F33.41 ; PTSD (post-traumatic stress disorder) F43.10 and ADHD (attention 
deficit hyperactivity disorder), inattentive type F90.0

 

 [facility]  [address] [place] [address] 19144-
4896  13 Sep, 2017   

 

 [facility]  [id number] [place] [address] 02435-
6346  11 Aug, 2017   

 

 [facility]  [id number] [place] [address] 20717-
4627     

 

 [facility]  [id number] [place] [address] 45[address]-
8996     

 

 [facility]  [id number] [place] [address] 74243-
8997    ADHD (attention deficit hyperactivity disorder) F90.9

 

 [facility]  [address], [place] [address]-
8919    Recurrent major depressive disorder, in partial remission 
F33.41 ; SUMAYA (generalized anxiety disorder) F41.1 ; Chronic post-traumatic 
stress disorder (PTSD) F43.12 and ADHD (attention deficit hyperactivity disorder
) F90.9

 

 [facility]  [address], [place] [address]-
9305  24 May, 2017   

 

 [facility]  [id number] [address]-
7086  19 May, 2017   

 

 [facility]  [id number] [address]-
8384     

 

 [facility]  [id number] [address]-
9416  30 Mar, 2017   

 

 [facility]  [id number] [address]-
9492  28 Mar, 2017   

 

 [facility]  [address], [place] [address]-
5396  23 Mar, 2017   

 

 [facility]  [id number] [place] [address] 12403-
6574     

 

 [facility]  [id number] [place] [address] 08683-
4489     

 

 [facility]  [id number] [place] [address] 33358-
3569    Recurrent major depressive disorder, in partial remission 
F33.41 ; PTSD (post-traumatic stress disorder) F43.10 and ADHD (attention 
deficit hyperactivity disorder) F90.9

 

 [facility]  [id number] [place] [address] 53029-
3630  31 Oct, 2016   

 

 [facility]  [id number] [place] [address] 18724-
1643  31 Oct, 2016   

 

 [facility]  [id number] [place] [address] 92303-
7487  27 Oct, 2016   

 

 [facility]  [address] [place] [address] 39964-
9741  25 Oct, 2016   

 

 [facility]  [id number] [place] [address] 22188-
2734  17 Oct, 2016   

 

 [facility]  [address], [place] [address]-
5641  08 Sep, 2016  Bronchitis J40

 

 [facility]  [address], [place] [address]-
2414  08 Sep, 2016   

 

 [facility]  [address], [place] [address]-
9087  08 Sep, 2016   

 

 [facility]  [id number] [place] [address] 79299-
4834  12 May, 2016   

 

 [facility]  [address], [place] [address]-
4618  04 May, 2016  GERD (gastroesophageal reflux disease) K21.9 and Birth 
control Z30.9

 

 [facility]  [address], [place] [address]-
0980  02 2015  Routine gynecological examination V72.31 ; Pap test, as 
part of routine gynecological examination V76.2 ; Screen for STD (sexually 
transmitted disease) V74.5 ; Breast cancer screening V76.10 ; Colon cancer 
screening V76.51 and Oral contraceptive pill surveillance V25.41

 

 [facility]  [id number] [place] [address] 35515-
2557  14 2015   

 

 [facility]  [address], [place] [address]-
5295  13 2015   

 

 [facility]  [id number] [place] [address] 29271-
7884  30 Dec, 2014   

 

 [facility]  [id number] [address]-
6511  30 Dec, 2014   

 

 [facility]  [id number] [place] [address] 335[address]-
3754     

 

 [facility]  [address], [place] [address]-
2358     

 

 [facility]  [id number] [place] [address] 53494-
8451     

 

 [facility]  [address], [place] [address]-
3027     

 

 [facility]  [address], [place] [address]-
4226     

 

 [facility]  [address], [place] [address]-
2489     

 

 [facility]  [id number] [address]-
2768  30 Sep, 2014   

 

 [facility]  [address], [place] [address]-
3762  30 Sep, 2014   







IMMUNIZATIONS

No Known Immunizations



SOCIAL HISTORY

Never Assessed



REASON FOR VISIT

 f/u



PLAN OF CARE







 Activity  Details









  









 Follow Up  4 Months Reason:







VITAL SIGNS







 Height  69 in  2018

 

 Weight  193.2 lbs  2018

 

 Heart Rate  88 bpm  2018

 

 Respiratory Rate  20   2018

 

 BMI  28.53 kg/m2  2018

 

 Blood pressure systolic  150 mmHg  2018

 

 Blood pressure diastolic  82 mmHg  2018







MEDICATIONS







 Medication  Instructions  Dosage  Frequency  Start Date  End Date  Duration  
Status

 

 Adderall XR 10 mg  Orally Once at 2pm for ADHD  1 capsule         
    Active

 

 Adderall XR 20 mg  Orally Once a day for ADHD  1 Capsule          
   Active

 

 Junel FE  1 mg-20 mcg  Orally Once a day  TAKE ONE TABLET BY MOUTH DAILY  
24h        28  Not-Taking

 

 Effexor  MG  Orally Once a day for depression  2 capsule with food     
30 Mar, 2017        Active

 

 Nexium 40 mg  Orally Once a day  1 capsule  24h  04 May, 2016     30 day(s)  
Active

 

 Fluoxetine 40 mg  by oral route each morning for depression  2 Capsule             Active

 

 Strattera 100 mg  Orally Once each morning for ADHD  1 capsule     30 Sep, 
2014        Active

 

 Trazodone HCl 300 MG  Orally Once a day for sleep  1 tablet at bedtime     27 
Mar, 2017        Active

 

 BusPIRone HCl 15 mg  Orally Twice a day for anxiety  1 tablet     27 Mar, 2017
        Active







RESULTS

No Results



PROCEDURES

No Known procedures



INSTRUCTIONS





MEDICATIONS ADMINISTERED

No Known Medications



MEDICAL (GENERAL) HISTORY







 Type  Description  Date

 

 Medical History  gastrointestinal disorder Hiatal Hernia   

 

 Medical History  Psychiatric disorders Depression , anxiety   

 

 Medical History  ADHD   

 

 Medical History  Cervical CA-with cone biopsy    

 

 Surgical History  sinus  surgery  

 

 Hospitalization History  Childhood panic - hospitalized for heart eval  1971

## 2018-07-10 NOTE — XMS REPORT
[facility]

 Created on: 2018



Evelin Rahman

External Reference #: [id number]

: 1960

Sex: Female



Demographics







 Address  [address] [place]  [address]

 

 Preferred Language  Unknown

 

 Marital Status  Unknown

 

 Zoroastrianism Affiliation  Unknown

 

 Race  Unknown

 

 Ethnic Group  Unknown





Author







 Author  KANE THOMPSON

 

 [facility]

 

 Address  [address] [place]  [address]



 

 Phone  [phone]







Care Team Providers







 Care Team Member Name  Role  Phone

 

 KANE THOMPSON  Unavailable  [phone]







PROBLEMS







 Type  Condition  ICD9-CM Code  GIR59-HO Code  Onset Dates  Condition Status  
SNOMED Code

 

 Problem  Anxiety     F41.9     Active  54299246

 

 Problem  Major depression, chronic     F32.9     Active  298939112

 

 Problem  PTSD (post-traumatic stress disorder)     F43.10     Active  56082823

 

 Problem  Birth control     Z30.9     Active  09611388

 

 Problem  ADHD (attention deficit hyperactivity disorder)     F90.9     Active  
233099125

 

 Problem  Depression     F32.9     Active  39996215

 

 Problem  GERD (gastroesophageal reflux disease)     K21.9     Active  818442431

 

 Problem  History of fatty infiltration of liver     Z87.19     Active  
199395636

 

 Problem  Encounter for drug screening     Z02.83     Active  937062544

 

 Problem  SUMAYA (generalized anxiety disorder)     F41.1     Active  15657796

 

 Problem  Recurrent major depressive disorder, in partial remission     F33.41 
    Active  92198590

 

 Problem  ADHD (attention deficit hyperactivity disorder), inattentive type     
F90.0     Active  40326805

 

 Problem  Chronic post-traumatic stress disorder (PTSD)     F43.12     Active  
669710689







ALLERGIES

No Information



ENCOUNTERS







 Encounter  Location  Date  Diagnosis

 

 [facility]  [id number], [place] [address]-
5038  02 Aug, 2018   

 

 [facility]  [id number], [place] [address]-
6069  03 Apr, 2018  Recurrent major depressive disorder, in partial remission 
F33.41 ; Encounter for drug screening Z02.83 ; ADHD (attention deficit 
hyperactivity disorder), inattentive type F90.0 ; Chronic post-traumatic stress 
disorder (PTSD) F43.12 ; Family history of heart disease Z82.49 and History of 
fatty infiltration of liver Z87.19

 

 [facility]  [id number], [place] [address]-
3480  22 Mar, 2018  GERD (gastroesophageal reflux disease) K21.9

 

 [address] [place] [address] 48[address]-
5849  08 Mar, 2018  GERD (gastroesophageal reflux disease) K21.9 ; History of 
fatty infiltration of liver Z87.19 ; Family history of heart disease Z82.49 ; 
Routine adult health maintenance Z00.00 and Encounter for screening for 
malignant neoplasm of colon Z12.11

 

 [id number] [place] [address] 40874-
5251  07 Mar, 2018  Acute nasopharyngitis J00

 

 [id number] [place] [address] 20569-
5735  28 2018   

 

 [id number] [place] [address] 37313-
8253     

 

 [id number] [place] [address] 45266-
3210    Recurrent major depressive disorder, in partial remission 
F33.41 ; ADHD (attention deficit hyperactivity disorder), inattentive type 
F90.0 and PTSD (post-traumatic stress disorder) F43.10

 

 [address] [place] [address] 73481-
3610  05 Dec, 2017   

 

 [address] [place] [address] 50784-
2346     

 

 [id number] [place] [address] 02312-
3924  12 Oct, 2017  Recurrent major depressive disorder, in partial remission 
F33.41 ; PTSD (post-traumatic stress disorder) F43.10 and ADHD (attention 
deficit hyperactivity disorder), inattentive type F90.0

 

 [id number] [place] [address] 89183-
5095  13 Sep, 2017   

 

 [id number] [place] [address] 88094-
6653  11 Aug, 2017   

 

 [id number] [address]-
0922     

 

 [facility]  [address], [place] [address]-
8206     

 

 [facility]  [address], [place] [address]
2546    ADHD (attention deficit hyperactivity disorder) F90.9

 

 [facility]  [address], [place] [address]
2546    Recurrent major depressive disorder, in partial remission 
F33.41 ; SUMAYA (generalized anxiety disorder) F41.1 ; Chronic post-traumatic 
stress disorder (PTSD) F43.12 and ADHD (attention deficit hyperactivity disorder
) F90.9

 

 [facility]  [address], [place] [address]-
6476  24 May, 2017   

 

 [facility]  [address], [place] [address]-
6416  19 May, 2017   

 

 [facility]  [address], [place] [address]-
1250     

 

 [facility]  [address], [place] [address]-
1646  30 Mar, 2017   

 

 [facility]  [address], [place] [address]-
6576  28 Mar, 2017   

 

 [facility]  [address], [place] [address]-
9636  23 Mar, 2017   

 

 [facility]  [address], [place] [address]
2546     

 

 [facility]  [address], [place] [address]-
2246     

 

 [facility]  [address] [address]-
5026    Recurrent major depressive disorder, in partial remission 
F33.41 ; PTSD (post-traumatic stress disorder) F43.10 and ADHD (attention 
deficit hyperactivity disorder) F90.9

 

 [facility]  [address], [place] [address]-
2907  31 Oct, 2016   

 

 [facility]  [id number] [place] [address] 32549-
5616  31 Oct, 2016   

 

 [facility]  [address], [place] [address]-
8704  27 Oct, 2016   

 

 [facility]  [id number] [place] [address] 26390-
5649  25 Oct, 2016   

 

 [facility]  [address] [place] [address] 91067-
6685  17 Oct, 2016   

 

 [facility]  [id number] [place] [address] 67032-
0711  08 Sep, 2016  Bronchitis J40

 

 [facility]  [id number] [place] [address] 06351-
8844  08 Sep, 2016   

 

 [facility]  [id number] [place] [address] 32351-
0278  08 Sep, 2016   

 

 [facility]  [address] [place] [address] 65192-
2088  12 May, 2016   

 

 [facility]  [id number] [place] [address] 85796-
9832  04 May, 2016  GERD (gastroesophageal reflux disease) K21.9 and Birth 
control Z30.9

 

 [facility]  [address] [place] [address] 37662-
6429    Routine gynecological examination V72.31 ; Pap test, as 
part of routine gynecological examination V76.2 ; Screen for STD (sexually 
transmitted disease) V74.5 ; Breast cancer screening V76.10 ; Colon cancer 
screening V76.51 and Oral contraceptive pill surveillance V25.41

 

 [facility]  [address], [place] [address]-
4945  14 2015   

 

 [facility]  [address], [place] [address]-
3033     

 

 [facility]  [address] [facility] [id number] [address]-
1622  30 Dec, 2014   

 

 [facility]  [address] [facility] [id number] [address]-
3141  30 Dec, 2014   

 

 [facility]  [address] [facility] [id number] [address]-
0248     

 

 [facility]  [address] [facility] [id number] [address]-
1791     

 

 [facility]  [address] [facility] [id number] [address]-
6436     

 

 [facility]  [address] [facility] [id number] [address]-
7479     

 

 [facility]  [address], [place] [address]-
4721     

 

 [facility]  [address], [place] [address]-
6329     

 

 [facility]  [id number], [place] [address]-
1207  30 Sep, 2014   

 

 [facility]  [id number], [place] [address]-
9778  30 Sep, 2014   







IMMUNIZATIONS

No Known Immunizations



SOCIAL HISTORY

Never Assessed



REASON FOR VISIT

adderall 2017



PLAN OF CARE





VITAL SIGNS





MEDICATIONS







 Medication  Instructions  Dosage  Frequency  Start Date  End Date  Duration  
Status

 

 Adderall XR 10 mg  Orally Once at 2pm for ADHD  1 capsule     14 Sep, 2017     
28 days  Active

 

 Adderall XR 20 mg  Orally Once a day for ADHD  1 Capsule     14 Sep, 2017     
28 days  Active







RESULTS

No Results



PROCEDURES

No Known procedures



INSTRUCTIONS





MEDICATIONS ADMINISTERED

No Known Medications



MEDICAL (GENERAL) HISTORY







 Type  Description  Date

 

 Medical History  gastrointestinal disorder Hiatal Hernia   

 

 Medical History  Psychiatric disorders Depression , anxiety   

 

 Medical History  ADHD   

 

 Medical History  Cervical CA-with cone biopsy    

 

 Surgical History  sinus  surgery  

 

 Hospitalization History  Childhood panic - hospitalized for heart eval  1971

## 2018-07-10 NOTE — XMS REPORT
[facility]

 Created on: 2017



Evelin Rahman

External Reference #: [id number]

: 1960

Sex: Female



Demographics







 Address  [address]  [address]

 

 Preferred Language  Unknown

 

 Marital Status  Unknown

 

 Restorationist Affiliation  Unknown

 

 Race  Unknown

 

 Ethnic Group  Unknown





Author







 Author  KANE THOMPSON

 

 Organization  [facility]

 

 Address  [address] [place]  [address]



 

 Phone  [phone]







Care Team Providers







 Care Team Member Name  Role  Phone

 

 KANE THOMPSON  Unavailable  [phone]







PROBLEMS







 Type  Condition  ICD9-CM Code  HAR87-EM Code  Onset Dates  Condition Status  
SNOMED Code

 

 Problem  ADHD (attention deficit hyperactivity disorder)     F90.9     Active  
781536899

 

 Problem  GERD (gastroesophageal reflux disease)     K21.9     Active  639958180

 

 Problem  Depression     F32.9     Active  25919833

 

 Problem  Birth control     Z30.9     Active  13966164

 

 Problem  SUMAYA (generalized anxiety disorder)     F41.1     Active  31281295

 

 Problem  Chronic post-traumatic stress disorder (PTSD)     F43.12     Active  
251995070

 

 Problem  PTSD (post-traumatic stress disorder)     F43.10     Active  34978944

 

 Problem  Anxiety     F41.9     Active  67691752

 

 Problem  Recurrent major depressive disorder, in partial remission     F33.41 
    Active  30633830

 

 Problem  Major depression, chronic     F32.9     Active  634641835







ALLERGIES

No Known Allergies



SOCIAL HISTORY

Never Assessed



PLAN OF CARE







 Activity  Details









  









 Follow Up  4 Months Reason:







VITAL SIGNS







 Height  69 in  2017

 

 Weight  192.2 lbs  2017

 

 Heart Rate  108 bpm  2017

 

 Respiratory Rate  20   2017

 

 BMI  28.38 kg/m2  2017

 

 Blood pressure systolic  190 mmHg  2017

 

 Blood pressure diastolic  90 mmHg  2017







MEDICATIONS







 Medication  Instructions  Dosage  Frequency  Start Date  End Date  Duration  
Status

 

 trazodone 300 mg  orally at bedtime for sleep  1 tablet     30 Dec, 2014      
  Active

 

 Strattera 100 mg  Orally Once each morning  1 capsule     30 Sep, 2014        
Active

 

 buspirone 15 mg  by oral route 2 times a day for anxeity  1 tablet     30 Sep, 
2014        Active

 

 Junel FE 1/20 1 mg-20 mcg  Orally Once a day  TAKE ONE TABLET BY MOUTH DAILY  
24h        28  Active

 

 Fluoxetine 40 mg  by oral route each morning  2 Capsule           
  Active

 

 Nexium 40 mg  Orally Once a day  1 capsule  24h  04 May, 2016     30 day(s)  
Active

 

 Adderall XR 20 MG  Orally Once a day for ADHD  1 Capsule          
   Active

 

 Adderall XR 10 mg  Orally Once at 2pm for ADHD  1 capsule         
    Active







RESULTS







 Name  Result  Date  Reference Range

 

 URINE DRUG SCREEN (IN HOUSE)     2017   

 

 Lot #  [id number]      

 

 Exp date        

 

 Control  +      

 

 COCAINE  Negative      

 

 AMPH  Positive      

 

 MTD  Negative      

 

 THC  Negative      

 

 OPIATE  Negative      

 

 BENZO  Negative      

 

 PCP  Negative      

 

 BAR  Negative      

 

 OXY  Negative      

 

 MAMP  Negative      

 

 TCA  Negative      

 

 BUP  Negative      

 

 MDMA  Negative      







PROCEDURES







 Procedure  Date Ordered  Result  Body Site

 

 DRUG TEST PRSMV DIR OPT OBS  2017      







IMMUNIZATIONS

No Known Immunizations



MEDICAL (GENERAL) HISTORY







 Type  Description  Date

 

 Medical History  gastrointestinal disorder Hiatal Hernia   

 

 Medical History  Psychiatric disorders Depression , anxiety   

 

 Medical History  ADHD   

 

 Medical History  Cervical CA-with cone biopsy    

 

 Surgical History  sinus  surgery  

 

 Hospitalization History  Childhood panic - hospitalized for heart eval  1971

## 2018-07-10 NOTE — XMS REPORT
[facility]

 Created on: 2018



Evelin Rahman

External Reference #: [id number]

: 1960

Sex: Female



Demographics







 Address  [address] [place] [place]  [address]

 

 Preferred Language  Unknown

 

 Marital Status  Unknown

 

 Sabianist Affiliation  Unknown

 

 Race  Unknown

 

 Ethnic Group  Unknown





Author







 Author  KANE THOMPSON

 

 [facility]

 

 Address  [address] [place]  [address]



 

 Phone  [phone]







Care Team Providers







 Care Team Member Name  Role  Phone

 

 KANE THOMPSON  Unavailable  [phone]







PROBLEMS







 Type  Condition  ICD9-CM Code  VVB35-TH Code  Onset Dates  Condition Status  
SNOMED Code

 

 Problem  GERD (gastroesophageal reflux disease)     K21.9     Active  268988242

 

 Problem  PTSD (post-traumatic stress disorder)     F43.10     Active  00175411

 

 Problem  Anxiety     F41.9     Active  48796876

 

 Problem  Birth control     Z30.9     Active  90668055

 

 Problem  ADHD (attention deficit hyperactivity disorder)     F90.9     Active  
324909849

 

 Problem  Depression     F32.9     Active  66208840

 

 Problem  History of fatty infiltration of liver     Z87.19     Active  
923685111

 

 Problem  ADHD (attention deficit hyperactivity disorder), inattentive type     
F90.0     Active  05389325

 

 Problem  Recurrent major depressive disorder, in partial remission     F33.41 
    Active  83682137

 

 Problem  Major depression, chronic     F32.9     Active  122596035

 

 Problem  Chronic post-traumatic stress disorder (PTSD)     F43.12     Active  
648010554

 

 Problem  SUMAYA (generalized anxiety disorder)     F41.1     Active  11620322







ALLERGIES

No Information



ENCOUNTERS







 Encounter  Location  Date  Diagnosis

 

 [facility]  [id number] [address]2     

 

 [facility]  [id number] [address]
0593     

 

 [facility]  [address] [place] [address]
1240  22 Mar, 2018   

 

 [facility]  [address] [place] [address]-
2877  08 Mar, 2018  GERD (gastroesophageal reflux disease) K21.9 ; History of 
fatty infiltration of liver Z87.19 ; Family history of heart disease Z82.49 ; 
Routine adult health maintenance Z00.00 and Encounter for screening for 
malignant neoplasm of colon Z12.11

 

 [facility]  [id number] [address]
8759  07 Mar, 2018  Acute nasopharyngitis J00

 

 [facility]  [id number] [address]
4186  28 2018   

 

 [facility]  [id number] [address]     

 

 [facility]  [id number] [address]
8458    Recurrent major depressive disorder, in partial remission 
F33.41 ; ADHD (attention deficit hyperactivity disorder), inattentive type 
F90.0 and PTSD (post-traumatic stress disorder) F43.10

 

 [facility]  [id number] [address]-
4866  05 Dec, 2017   

 

 [facility]  [id number] [address]     

 

 [facility]  [id number] [address]
8941  12 Oct, 2017  Recurrent major depressive disorder, in partial remission 
F33.41 ; PTSD (post-traumatic stress disorder) F43.10 and ADHD (attention 
deficit hyperactivity disorder), inattentive type F90.0

 

 [facility]  [id number] [address]-
7490  13 Sep, 2017   

 

 [facility]  [address] [address]-
0613  11 Aug, 2017   

 

 [facility]  [id number] [address]-
8986  14 2017   

 

 [facility]  [address] [address]-
0206  16 2017   

 

 [facility]  [id number] [address]-
9157    ADHD (attention deficit hyperactivity disorder) F90.9

 

 [facility]  [address] [address]
4872    Recurrent major depressive disorder, in partial remission 
F33.41 ; SUMAYA (generalized anxiety disorder) F41.1 ; Chronic post-traumatic 
stress disorder (PTSD) F43.12 and ADHD (attention deficit hyperactivity disorder
) F90.9

 

 [facility]  [address] [place] [address]-
3326  24 May, 2017   

 

 [facility]  [id number] [place] [address]-
2706  19 May, 2017   

 

 [facility]  [id number] [place] [address], [place] [address]-
[address]     

 

 [facility]  [id number] [place] [address], [place] [address]-
5046  30 Mar, 2017   

 

 [facility]  [id number] [place] [address]-
3221  28 Mar, 2017   

 

 [facility]  [id number] [place] [address]-
7025  23 Mar, 2017   

 

 [facility]  [id number] [place] [address], [place] [address]-
1923     

 

 [facility]  [id number] [address]-
[address]     

 

 [facility]  [id number] [address]-
2457    Recurrent major depressive disorder, in partial remission 
F33.41 ; PTSD (post-traumatic stress disorder) F43.10 and ADHD (attention 
deficit hyperactivity disorder) F90.9

 

 [facility]  [id number] [address]-
7236  31 Oct, 2016   

 

 [facility]  [id number] [place] [address]-
9356  31 Oct, 2016   

 

 [facility]  [id number] [address]-
4857  27 Oct, 2016   

 

 [facility]  [address] [address]-
3420  25 Oct, 2016   

 

 [facility]  [address] [address]-
2984  17 Oct, 2016   

 

 [facility]  [address] [address]-
1877  08 Sep, 2016  Bronchitis J40

 

 [facility]  [id number] [place] [address]-
4269  08 Sep, 2016   

 

 [facility]  [address] [place] [address]-
8827  08 Sep, 2016   

 

 [facility]  [address] [place] [address]-
1663  12 May, 2016   

 

 [facility]  [address] [address]-
4327  04 May, 2016  GERD (gastroesophageal reflux disease) K21.9 and Birth 
control Z30.9

 

 [facility]  [address], [place] [address]-
7893    Routine gynecological examination V72.31 ; Pap test, as 
part of routine gynecological examination V76.2 ; Screen for STD (sexually 
transmitted disease) V74.5 ; Breast cancer screening V76.10 ; Colon cancer 
screening V76.51 and Oral contraceptive pill surveillance V25.41

 

 [facility]  [address] [address]-
3921     

 

 [facility]  [address] [address]-
6492     

 

 [facility]  [address] [address]-
5191  30 Dec, 2014   

 

 [facility]  [address] [address]-
2323  30 Dec, 2014   

 

 [facility]  [address] [address]-
96[address]     

 

 [facility]  [id number] [address]-
7364     

 

 [facility]  [address] [address]-
3088     

 

 [facility]  [address] [address]-
9197     

 

 [facility]  [id number] [address]-
4[address]     

 

 [facility]  [address] [address]-
6531     

 

 [facility]  [address] [address]-
8263  30 Sep, 2014   

 

 [facility]  [address] [address]-
9202  30 Sep, 2014   







IMMUNIZATIONS

No Known Immunizations



SOCIAL HISTORY

Never Assessed



REASON FOR VISIT

adderall xr10 mg 2017



PLAN OF CARE





VITAL SIGNS





MEDICATIONS







 Medication  Instructions  Dosage  Frequency  Start Date  End Date  Duration  
Status

 

 Adderall XR 10 mg  Orally Once at 2pm for ADHD  1 capsule          
28 days  Active







RESULTS

No Results



PROCEDURES

No Known procedures



INSTRUCTIONS





MEDICATIONS ADMINISTERED

No Known Medications



MEDICAL (GENERAL) HISTORY







 Type  Description  Date

 

 Medical History  gastrointestinal disorder Hiatal Hernia   

 

 Medical History  Psychiatric disorders Depression , anxiety   

 

 Medical History  ADHD   

 

 Medical History  Cervical CA-with cone biopsy    

 

 Surgical History  sinus  surgery  

 

 Hospitalization History  Childhood panic - hospitalized for heart eval  1971

## 2018-07-10 NOTE — XMS REPORT
[facility]

 Created on: 2017



Evelin Rahman

External Reference #: [id number]

: 1960

Sex: Female



Demographics







 Address  [address]  [address]

 

 Preferred Language  Unknown

 

 Marital Status  Unknown

 

 [employer] Affiliation  Unknown

 

 Race  Unknown

 

 Ethnic Group  Unknown





Author







 Author  XIMENA HERNANDEZ

 

 [facility]

 

 Address  [address] [place]  [address]



 

 Phone  [phone]







Care Team Providers







 Care Team Member Name  Role  Phone

 

 XIMENA HERNANDEZ  Unavailable  [phone]







PROBLEMS







 Type  Condition  ICD9-CM Code  WUI46-RT Code  Onset Dates  Condition Status  
SNOMED Code

 

 Problem  Depression     F32.9     Active  05664196

 

 Problem  ADHD (attention deficit hyperactivity disorder)     F90.9     Active  
498341583

 

 Problem  Birth control     Z30.9     Active  98373404

 

 Assessment  Bronchitis     J40  08 Sep, 2016  Active  16165014

 

 Problem  Anxiety     F41.9     Active  86642285

 

 Problem  GERD (gastroesophageal reflux disease)     K21.9     Active  272558991







ALLERGIES







 Substance  Reaction  Event Type  Date  Status

 

 N.K.D.A.  Unknown  Non Drug Allergy  08 Sep, 2016  Unknown







SOCIAL HISTORY

No smoking Hx information available



PLAN OF CARE





VITAL SIGNS







 Height  69 in  2016

 

 Weight  192 lbs  2016

 

 Heart Rate  80 bpm  2016

 

 Respiratory Rate  18   2016

 

 Oximetry  98 %  2016

 

 BMI  28.35 kg/m2  2016

 

 Blood pressure systolic  146 mmHg  2016

 

 Blood pressure diastolic  96 mmHg  2016







MEDICATIONS







 Medication  Instructions  Dosage  Frequency  Start Date  End Date  Duration  
Status

 

 Strattera 100 MG  Orally Once a day  1 capsule  24h  30 Sep, 2014        Active

 

 Effexor  mg     2 Capsule by Oral route 1 time per day      
       Active

 

 Fluoxetine 40 mg     2 Capsule by Oral route 1 time per day       
      Active

 

 Junel FE  1 mg-20 mcg     TAKE ONE TABLET BY MOUTH DAILY           28  
Active

 

 Adderall XR 30 mg     1 Capsule by Oral route 1 time per day for ADHD             Active

 

 Levaquin 500 MG  Orally Once a day  1 tablet  24h  08 Sep, 2016  15 Sep, 2016  
07 days  Active

 

 buspirone 15 mg     1 Tablet by Oral route 3 times per day for anxiety     30 
Sep, 2014        Active

 

 trazodone 300 mg     1 Tablet by Oral route 1 time per day PRN qHS     30 Dec, 
2014        Active

 

 Nexium 40 mg  Orally Once a day  1 capsule  24h  04 May, 2016     30 day(s)  
Active







RESULTS







 Name  Result  Date  Reference Range

 

 Xray : Chest (IN HOUSE)     2016   







PROCEDURES







 Procedure  Date Ordered  Related Diagnosis  Body Site

 

 MEASURE BLOOD OXYGEN LEVEL  2016      

 

 CHEST X-RAY  2016      

 

 Office Visit, Est Pt., Level 3  2016      







IMMUNIZATIONS

No Known Immunizations

## 2018-07-10 NOTE — XMS REPORT
[facility]

 Created on: 2016



RahmanElainea

External Reference #: [id number]

: 1960

Sex: Female



Demographics







 Address  [address]  [address]

 

 Home Phone  [phone]

 

 Preferred Language  Unknown

 

 Marital Status  Unknown

 

 Sikh Affiliation  Unknown

 

 Race  White

 

 Ethnic Group  Not  or 





Author







 NIKKI Puga

 

 Organization  [employer]

 

 Address  Unknown

 

 Phone  Unavailable







Care Team Providers







 Care Team Member Name  Role  Phone

 

 NIKKI MAYERS    Unavailable



                                                                



Allergies

          No Known Allergies                                                   
                                     



Problems

          





 Problem Type  Condition  Code  Onset Dates  Condition Status

 

 Problem  ADHD (attention deficit hyperactivity disorder)  F90.9     Active

 

 Problem  Anxiety  F41.9     Active

 

 Problem  Depression  F32.9     Active

 

 Problem  GERD (gastroesophageal reflux disease)  K21.9     Active

 

 Problem  Birth control  Z30.9     Active



                                                                               
                                                 



Medications

          





 Medication  Code System  Code  Instructions  Start Date  End Date  Status  
Dosage

 

 Junel FE 1/20  NDC  [id number]  1 mg-20 mcg Orally Once a day           TAKE 
ONE TABLET BY MOUTH DAILY



                                                                              



Results

          No Known Results                                                     
               



Summary Purpose

          [employer] Submission

## 2018-07-10 NOTE — XMS REPORT
[facility]

 Created on: 2018



Evelin Rahman

External Reference #: [id number]

: 1960

Sex: Female



Demographics







 Address  [address] [place]  [address]

 

 Preferred Language  Unknown

 

 Marital Status  Unknown

 

 Jehovah's witness Affiliation  Unknown

 

 Race  Unknown

 

 Ethnic Group  Unknown





Author







 Author  KANE THOPMSON

 

 [facility]

 

 Address  [address] [place]  [address]



 

 Phone  [phone]







Care Team Providers







 Care Team Member Name  Role  Phone

 

 KANE THOMPSON  Unavailable  [phone]







PROBLEMS







 Type  Condition  ICD9-CM Code  OXT86-ZH Code  Onset Dates  Condition Status  
SNOMED Code

 

 Problem  Anxiety     F41.9     Active  22188478

 

 Problem  Major depression, chronic     F32.9     Active  320108904

 

 Problem  PTSD (post-traumatic stress disorder)     F43.10     Active  11201801

 

 Problem  Birth control     Z30.9     Active  88407066

 

 Problem  ADHD (attention deficit hyperactivity disorder)     F90.9     Active  
230820474

 

 Problem  Depression     F32.9     Active  08908358

 

 Problem  GERD (gastroesophageal reflux disease)     K21.9     Active  760577671

 

 Problem  History of fatty infiltration of liver     Z87.19     Active  
694961215

 

 Problem  Encounter for drug screening     Z02.83     Active  905771311

 

 Problem  SUMAYA (generalized anxiety disorder)     F41.1     Active  94053750

 

 Problem  Recurrent major depressive disorder, in partial remission     F33.41 
    Active  24667193

 

 Problem  ADHD (attention deficit hyperactivity disorder), inattentive type     
F90.0     Active  55614338

 

 Problem  Chronic post-traumatic stress disorder (PTSD)     F43.12     Active  
874307559







ALLERGIES

No Known Allergies



ENCOUNTERS







 Encounter  Location  Date  Diagnosis

 

 [facility]  [id number] [address]
0423  02 Aug, 2018   

 

 [facility]  [id number] [address]
6557     

 

 [facility]  [id number] [place] [address]-
8390  03 2018  Recurrent major depressive disorder, in partial remission 
F33.41 ; Encounter for drug screening Z02.83 ; ADHD (attention deficit 
hyperactivity disorder), inattentive type F90.0 ; Chronic post-traumatic stress 
disorder (PTSD) F43.12 ; Family history of heart disease Z82.49 and History of 
fatty infiltration of liver Z87.19

 

 [address] [address] [address]
8268  22 Mar, 2018  GERD (gastroesophageal reflux disease) K21.9

 

 [address], [place] [address]
2833  08 Mar, 2018  GERD (gastroesophageal reflux disease) K21.9 ; History of 
fatty infiltration of liver Z87.19 ; Family history of heart disease Z82.49 ; 
Routine adult health maintenance Z00.00 and Encounter for screening for 
malignant neoplasm of colon Z12.11

 

 [address] [address] [address]
9418  07 Mar, 2018  Acute nasopharyngitis J00

 

 [address] [address]
1074  28 2018   

 

 [address] [address]
1711     

 

 [address] [address]
2212    Recurrent major depressive disorder, in partial remission 
F33.41 ; ADHD (attention deficit hyperactivity disorder), inattentive type 
F90.0 and PTSD (post-traumatic stress disorder) F43.10

 

 [address] [address] [address]
8785  05 Dec, 2017   

 

 [address] [address] [address]-
2061     

 

 [address] [address]
8256  12 Oct, 2017  Recurrent major depressive disorder, in partial remission 
F33.41 ; PTSD (post-traumatic stress disorder) F43.10 and ADHD (attention 
deficit hyperactivity disorder), inattentive type F90.0

 

 [place] [address] [address], [place] [address]
5515  13 Sep, 2017   

 

 [address] [address]
3237  11 Aug, 2017   

 

 [facility]  [address] [address]-
9967     

 

 [facility]  [address] [address]-
[address]     

 

 [facility]  [address], [place] [address]
0806    ADHD (attention deficit hyperactivity disorder) F90.9

 

 [facility]  [address], [place] [address]
0466    Recurrent major depressive disorder, in partial remission 
F33.41 ; SUMAYA (generalized anxiety disorder) F41.1 ; Chronic post-traumatic 
stress disorder (PTSD) F43.12 and ADHD (attention deficit hyperactivity disorder
) F90.9

 

 [facility]  [address] [address]-
9586  24 May, 2017   

 

 [facility]  [address] [address]-
5686  19 May, 2017   

 

 [facility]  [address] [address]-
6430     

 

 [facility]  [address] [address]
8286  30 Mar, 2017   

 

 [facility]  [address] [address]-
2297  28 Mar, 2017   

 

 [facility]  [address] [address]-
3396  23 Mar, 2017   

 

 [facility]  [address] [address]-
9077     

 

 [facility]  [address] [address]-
3746     

 

 [facility]  [address] [address]-
6666  07 Feb, 2017  Recurrent major depressive disorder, in partial remission 
F33.41 ; PTSD (post-traumatic stress disorder) F43.10 and ADHD (attention 
deficit hyperactivity disorder) F90.9

 

 [facility]  [address] [address]-
8116  31 Oct, 2016   

 

 [facility]  [address] [address]-
4524  31 Oct, 2016   

 

 [facility]  [address] [place] [address]-
9493  27 Oct, 2016   

 

 [facility]  [address] [place] [address]-
8367  25 Oct, 2016   

 

 [facility]  [id number] [place] [address]-
3605  17 Oct, 2016   

 

 [facility]  [id number] [place] [address]-
5813  08 Sep, 2016  Bronchitis J40

 

 [facility]  [id number] [place] [address]-
9536  08 Sep, 2016   

 

 [facility]  [id number] [place] [address]-
3358  08 Sep, 2016   

 

 [facility]  [address] [place] [address]-
4033  12 May, 2016   

 

 [facility]  [address] [address]-
7455  04 May, 2016  GERD (gastroesophageal reflux disease) K21.9 and Birth 
control Z30.9

 

 [facility]  [address], [place] [address]-
9898    Routine gynecological examination V72.31 ; Pap test, as 
part of routine gynecological examination V76.2 ; Screen for STD (sexually 
transmitted disease) V74.5 ; Breast cancer screening V76.10 ; Colon cancer 
screening V76.51 and Oral contraceptive pill surveillance V25.41

 

 [facility]  [address], [place] [address]-
1591     

 

 [facility]  [address], [place] [address]-
1188     

 

 [facility]  [address], [place] [address]-
6634  30 Dec, 2014   

 

 [facility]  [address], [place] [address]-
0391  30 Dec, 2014   

 

 [facility]  [address] [address]-
0566     

 

 [facility]  [address], [place] [address]-
6945     

 

 [facility]  [address], [place] [address]
2340     

 

 [facility]  [address], [place] [address]-
1812     

 

 [facility]  [address], [place] [address]-
3473     

 

 [facility]  [address], [place] [address]-
7430     

 

 [facility]  [address], [place] [address]
4776  30 Sep, 2014   

 

 [facility]  [address], [place] [address]-
8158  30 Sep, 2014   







IMMUNIZATIONS

No Known Immunizations



SOCIAL HISTORY

Never Assessed



REASON FOR VISIT

 f/u Yeni



PLAN OF CARE







 Activity  Details









  









 Follow Up  3 Months Reason:







VITAL SIGNS







 Height  69 in  2017-10-12

 

 Weight  189.9 lbs  2017-10-12

 

 Heart Rate  80 bpm  2017-10-12

 

 Respiratory Rate  18   2017-10-12

 

 BMI  28.04 kg/m2  2017-10-12

 

 Blood pressure systolic  144 mmHg  2017-10-12

 

 Blood pressure diastolic  88 mmHg  2017-10-12







MEDICATIONS







 Medication  Instructions  Dosage  Frequency  Start Date  End Date  Duration  
Status

 

 Effexor  MG  Orally Once a day  2 capsule with food  24h  30 Mar, 2017  
      Active

 

 Trazodone HCl 300 MG  Orally Once a day  1 tablet at bedtime  24h  27 Mar, 
2017        Active

 

 Nexium 40 mg  Orally Once a day  1 capsule  24h  04 May, 2016     30 day(s)  
Active

 

 Adderall XR 20 mg  Orally Once a day for ADHD  1 Capsule     12 Oct, 2017     
28 days  Active

 

 BusPIRone HCl 15 mg  Orally Twice a day  1 tablet  12h  27 Mar, 2017        
Active

 

 Strattera 100 mg  Orally Once each morning  1 capsule     30 Sep, 2014        
Active

 

 Fluoxetine 40 mg  by oral route each morning  2 Capsule           
  Active

 

 Adderall XR 10 mg  Orally Once at 2pm for ADHD  1 capsule     12 Oct, 2017     
28 days  Active







RESULTS

No Results



PROCEDURES

No Known procedures



INSTRUCTIONS





MEDICATIONS ADMINISTERED

No Known Medications



MEDICAL (GENERAL) HISTORY







 Type  Description  Date

 

 Medical History  gastrointestinal disorder Hiatal Hernia   

 

 Medical History  Psychiatric disorders Depression , anxiety   

 

 Medical History  ADHD   

 

 Medical History  Cervical CA-with cone biopsy    

 

 Surgical History  sinus  surgery  

 

 Hospitalization History  Childhood panic - hospitalized for heart eval  1971

## 2018-07-10 NOTE — XMS REPORT
[facility]

 Created on: 10/27/2016



Evelin Rahman

External Reference #: [id number]

: 1960

Sex: Female



Demographics







 Address  [address]  [address]

 

 Home Phone  [phone]

 

 Preferred Language  Unknown

 

 Marital Status  Unknown

 

 Yazidism Affiliation  Unknown

 

 Race  White

 

 Ethnic Group  Not  or 





Author







 NIKKI Puga

 

 Organization  [employer]

 

 Address  Unknown

 

 Phone  Unavailable







Care Team Providers







 Care Team Member Name  Role  Phone

 

 NIKKI MAYERS  CP  Unavailable



                                                                



Allergies

          No Known Allergies                                                   
                                     



Problems

          





 Problem Type  Condition  Code  Onset Dates  Condition Status

 

 Problem  ADHD (attention deficit hyperactivity disorder)  F90.9     Active

 

 Problem  Anxiety  F41.9     Active

 

 Problem  Depression  F32.9     Active

 

 Problem  GERD (gastroesophageal reflux disease)  K21.9     Active

 

 Problem  Birth control  Z30.9     Active



                                                                               
                                                 



Medications

          No Known Medications                                                 
                             



Results

          No Known Results                                                     
               



Summary Purpose

          [employer] Submission

## 2018-07-10 NOTE — XMS REPORT
[facility]

 Created on: 2017



Evelin Rahman

External Reference #: [id number]

: 1960

Sex: Female



Demographics







 Address  [address]  [address]

 

 Preferred Language  Unknown

 

 Marital Status  Unknown

 

 Zoroastrian Affiliation  Unknown

 

 Race  Unknown

 

 Ethnic Group  Unknown





Author







 Author  KANE THOMPSON

 

 Organization  [facility]

 

 Address  [address] [place]  [address]



 

 Phone  [phone]







Care Team Providers







 Care Team Member Name  Role  Phone

 

 KANE THOMPSON  Unavailable  [phone]







PROBLEMS







 Type  Condition  ICD9-CM Code  LKL59-HM Code  Onset Dates  Condition Status  
SNOMED Code

 

 Problem  ADHD (attention deficit hyperactivity disorder)     F90.9     Active  
041125066

 

 Problem  GERD (gastroesophageal reflux disease)     K21.9     Active  960982591

 

 Problem  Depression     F32.9     Active  89186801

 

 Problem  Birth control     Z30.9     Active  93811450

 

 Problem  SUMAYA (generalized anxiety disorder)     F41.1     Active  37161305

 

 Problem  Chronic post-traumatic stress disorder (PTSD)     F43.12     Active  
680056600

 

 Problem  PTSD (post-traumatic stress disorder)     F43.10     Active  46849205

 

 Problem  Anxiety     F41.9     Active  52084596

 

 Problem  Recurrent major depressive disorder, in partial remission     F33.41 
    Active  56201755

 

 Problem  Major depression, chronic     F32.9     Active  056144076







ALLERGIES

No Information



SOCIAL HISTORY

Never Assessed



PLAN OF CARE





VITAL SIGNS





MEDICATIONS







 Medication  Instructions  Dosage  Frequency  Start Date  End Date  Duration  
Status

 

 Adderall XR 10 mg  Orally Once at 2pm for ADHD  1 capsule          
28 days  Active

 

 Adderall XR 20 mg  Orally Once a day for ADHD  1 Capsule          
28 days  Active







RESULTS

No Results



PROCEDURES

No Known procedures



IMMUNIZATIONS

No Known Immunizations



MEDICAL (GENERAL) HISTORY







 Type  Description  Date

 

 Medical History  gastrointestinal disorder Hiatal Hernia   

 

 Medical History  Psychiatric disorders Depression , anxiety   

 

 Medical History  ADHD   

 

 Medical History  Cervical CA-with cone biopsy    

 

 Surgical History  sinus  surgery  

 

 Hospitalization History  Childhood panic - hospitalized for heart eval

## 2018-07-10 NOTE — DIAGNOSTIC IMAGING REPORT
INDICATION: Left ankle pain



EXAM: AP, oblique, and lateral views of the left ankle are

obtained.



FINDINGS:  

No acute fracture or acute bony abnormality is seen. There is

plantar calcaneal spurring.



IMPRESSION:

No acute abnormality of the left ankle.



Dictated by: 



  Dictated on workstation # [id number]

## 2018-07-10 NOTE — ED LOWER EXTREMITY
General


Chief Complaint:  Lower Extremity


Stated Complaint:  L ANKLE PAIN


Source:  patient


Exam Limitations:  no limitations





History of Present Illness


Date Seen by Provider:  Jul 10, 2018


Time Seen by Provider:  20:53


Initial Comments


Patient is 57-year-old female who presents to the emergency room with left 

ankle pain. She reports that she's been having ankle pain for one month that 

yesterday she was walking and hit her ankle on a wooden box in her house and 

thinks she cracked a bone. Patient was able to ambulate to the room with 

minimal difficulty and pain and has been walking on the foot since the injury. 

She denies trying any over-the-counter pain medication.


Onset:  yesterday


Severity:  mild


Pain/Injury Location:  left ankle


Method of Injury:  direct blow


Modifying Factors:  Improves With Movement





Allergies and Home Medications


Patient Home Medication List


Home Medication List Reviewed:  Yes





Constitutional:  see HPI; No chills, No diaphoresis, No dizziness, No fever


EENTM:  see HPI; No no symptoms reported, No ear discharge


Respiratory:  see HPI; No cough, No dyspnea on exertion


Cardiovascular:  see HPI; No chest pain, No edema, No Hx of Intervention


Gastrointestinal:  see HPI; No abdominal pain, No constipation


Genitourinary:  see HPI; No decreased output, No discharge


Musculoskeletal:  see HPI, joint pain


Skin:  see HPI (left ankle pain); No change in color, No change in hair/nails


Psychiatric/Neurological:  See HPI; Denies Anxiety, Denies Depressed


All Other Systems Reviewed


Negative Unless Noted:  Yes





Past Medical-Social-Family Hx


Past Med/Social Hx:  Reviewed Nursing Past Med/Soc Hx


Patient Social History


Recent Foreign Travel:  No


Contact w/Someone Who Travel:  No





Family Medical History


Reviewed Nursing Family Hx





Physical Exam


Vital Signs





Vital Signs - First Documented








 7/10/18 7/10/18





 20:50 21:57


 


Temp 98.4 


 


Pulse 100 


 


Resp 20 


 


B/P (MAP) 176/100 (125) 


 


Pulse Ox  97


 


O2 Delivery Room Air 





Capillary Refill :


Height, Weight, BMI


Height: ', "


Weight: lbs oz, kg Method: ,BMI


General Appearance:  WD/WN, no apparent distress


HEENT:  PERRL/EOMI, normal ENT inspection, TMs normal, pharynx normal


Neck:  non-tender, full range of motion, supple, normal inspection


Cardiovascular:  regular rate, rhythm, no edema, no gallop, no JVD, no murmur


Respiratory:  chest non-tender, lungs clear, normal breath sounds, no 

respiratory distress, no accessory muscle use


Gastrointestinal:  normal bowel sounds, non tender, soft, no organomegaly, no 

pulsatile mass


Back:  normal inspection, no CVA tenderness, no vertebral tenderness


Hips:  bilateral hip non-tender, bilateral hip normal inspection, bilateral hip 

normal range of motion, bilateral hip no evidence of injury


Legs:  bilateral leg non-tender, bilateral leg normal inspection, bilateral leg 

normal range of motion, bilateral leg no evidence of injury


Knees:  bilateral knee non-tender, bilateral knee normal inspection, bilateral 

knee normal range of motion, bilateral knee no evidence of injury


Ankles:  right ankle non-tender, right ankle normal inspection, right ankle 

normal range of motion, right ankle no evidence of injury; left ankle abrasions/

lacerations (there is a small abrasion to the outside left ankle. There is a 

well-formed scab and appears older than 2 days.), left ankle pain, left ankle 

soft tissue tenderness


Feet:  bilateral foot non-tender, bilateral foot normal inspection, bilateral 

foot normal range of motion, bilateral foot no evidence of injury


Neurologic/Tendon:  normal sensation, normal motor functions, normal tendon 

functions, responds to pain, no evidence tendon injury


Neurologic/Psychiatric:  alert, normal mood/affect, oriented x 3


Skin:  normal color, warm/dry, other (small abrasion as noted above.)


Lymphatic:  no adenopathy





Progress/Results/Core Measures


Results/Orders


My Orders





Orders - BRANDON LI


Ankle, Left, 3 Views (7/10/18 20:45)





Vital Signs/I&O











 7/10/18 7/10/18





 20:50 21:57


 


Temp 98.4 98.4


 


Pulse 100 96


 


Resp 20 14


 


B/P (MAP) 176/100 (125) 155/113


 


Pulse Ox  97


 


O2 Delivery Room Air Room Air











Progress


Progress Note :  


   Time:  21:43


Progress Note


I informed the patient that there is no acute fracture to the left ankle. An 

air stirrup was provided in the emergency room for discomfort. The patient 

agrees with plan to discharge and follow up with Dr. Candelaria within 1 week.





Diagnostic Imaging





   Diagonstic Imaging:  Xray


   Plain Films/CT/US/NM/MRI:  ankle


Comments


 VIA [facility].


 [place], [place]





NAME:   MIKAEL SCHWARTZ


MED REC#:   [id number]


ACCOUNT#:   [id number]


PT STATUS:   REG ER


:   1960


PHYSICIAN:   BRANDON LI


ADMIT DATE:   07/10/18/ER


 ***Draft***


Date of Exam:07/10/18





ANKLE, LEFT, 3 VIEWS








INDICATION: Left ankle pain





EXAM: AP, oblique, and lateral views of the left ankle are


obtained.





FINDINGS:  


No acute fracture or acute bony abnormality is seen. There is


plantar calcaneal spurring.





IMPRESSION:


No acute abnormality of the left ankle.





  Dictated on workstation # [id number]








Dict:   07/10/18 2119


Trans:   07/10/18 2127


[facility] [id number]





Interpreted by:     CR CLINTON MD


Electronically signed by:


   Reviewed:  Reviewed by Me





Departure


Impression





 Primary Impression:  


 Contusion of ankle


 Qualified Codes:  S90.02XA - Contusion of left ankle, initial encounter


Disposition:   HOME, SELF-CARE


Condition:  Stable/Unchanged





Departure-Patient Inst.


Decision time for Depature:  21:44


Referrals:  


XIMENA HERNANDEZ MD (PCP/Family)


Primary Care Physician


Patient Instructions:  Contusion (DC)





Add. Discharge Instructions:  


You may use the air stirrup as needed for discomfort in addition to ice at 20 

minute intervals. You may take ibuprofen and Tylenol as directed by the bottle. 

Follow-up with your doctor within 1 week for recheck. Return back to the 

emergency room for any concerns as needed. All discharge instructions reviewed 

with patient and/or family. Voiced understanding.











BRANDON LI Jul 10, 2018 20:54

## 2018-07-10 NOTE — XMS REPORT
[facility]

 Created on: 2016



Evelin Rahman

External Reference #: [id number]

: 1960

Sex: Female



Demographics







 Address  [address]  [address]

 

 Home Phone  [phone]

 

 Preferred Language  Unknown

 

 Marital Status  Unknown

 

 Sabianist Affiliation  Unknown

 

 Race  White

 

 Ethnic Group  Not  or 





Author







 NIKKI Puga

 

 Organization  [employer]

 

 Address  Unknown

 

 Phone  Unavailable







Care Team Providers







 Care Team Member Name  Role  Phone

 

 NIKKI MAYERS  CP  Unavailable



                                                                



Allergies

          No Known Allergies                                                   
                                     



Problems

          





 Problem Type  Condition  Code  Onset Dates  Condition Status

 

 Problem  ADHD (attention deficit hyperactivity disorder)  F90.9     Active

 

 Problem  Anxiety  F41.9     Active

 

 Problem  Depression  F32.9     Active

 

 Problem  GERD (gastroesophageal reflux disease)  K21.9     Active

 

 Problem  Birth control  Z30.9     Active



                                                                               
                                                 



Medications

          No Known Medications                                                 
                             



Results

          No Known Results                                                     
               



Summary Purpose

          [employer] Submission

## 2018-07-10 NOTE — XMS REPORT
[facility]

 Created on: 2018



Evelin Rahman

External Reference #: [id number]

: 1960

Sex: Female



Demographics







 Address  [address] [place]  [address]

 

 Preferred Language  Unknown

 

 Marital Status  Unknown

 

 Anabaptism Affiliation  Unknown

 

 Race  Unknown

 

 Ethnic Group  Unknown





Author







 Author  KANE THOMPSON

 

 [facility]

 

 Address  [address] [place]  [address]



 

 Phone  [phone]







Care Team Providers







 Care Team Member Name  Role  Phone

 

 KANE THOMPSON  Unavailable  [phone]







PROBLEMS







 Type  Condition  ICD9-CM Code  GAL35-BU Code  Onset Dates  Condition Status  
SNOMED Code

 

 Problem  GERD (gastroesophageal reflux disease)     K21.9     Active  080829116

 

 Problem  PTSD (post-traumatic stress disorder)     F43.10     Active  91642572

 

 Problem  Anxiety     F41.9     Active  28551134

 

 Problem  Birth control     Z30.9     Active  92231499

 

 Problem  ADHD (attention deficit hyperactivity disorder)     F90.9     Active  
019766082

 

 Problem  Depression     F32.9     Active  60872025

 

 Problem  History of fatty infiltration of liver     Z87.19     Active  
975648245

 

 Problem  ADHD (attention deficit hyperactivity disorder), inattentive type     
F90.0     Active  86721053

 

 Problem  Recurrent major depressive disorder, in partial remission     F33.41 
    Active  86955540

 

 Problem  Major depression, chronic     F32.9     Active  703041138

 

 Problem  Chronic post-traumatic stress disorder (PTSD)     F43.12     Active  
452208144

 

 Problem  SUMAYA (generalized anxiety disorder)     F41.1     Active  16357263







ALLERGIES

No Information



ENCOUNTERS







 Encounter  Location  Date  Diagnosis

 

 [facility]  [id number] [address]
4[address]     

 

 [facility]  [address] [place] [address]     

 

 [facility]  [address] [place] [address]
8609  22 Mar, 2018  GERD (gastroesophageal reflux disease) K21.9

 

 [facility]  [address] [place] [address]
7232  08 Mar, 2018  GERD (gastroesophageal reflux disease) K21.9 ; History of 
fatty infiltration of liver Z87.19 ; Family history of heart disease Z82.49 ; 
Routine adult health maintenance Z00.00 and Encounter for screening for 
malignant neoplasm of colon Z12.11

 

 [facility]  [id number] [address]
2396  07 Mar, 2018  Acute nasopharyngitis J00

 

 [facility]  [id number] [address]
9556  28 2018   

 

 [facility]  [id number] [place] [address]-
4[address]     

 

 [place] [address] [address]
0331    Recurrent major depressive disorder, in partial remission 
F33.41 ; ADHD (attention deficit hyperactivity disorder), inattentive type 
F90.0 and PTSD (post-traumatic stress disorder) F43.10

 

 [place] [address] [address]
0916  05 Dec, 2017   

 

 [place] [address] [address]-
3531     

 

 [facility]  [address] [address]28  12 Oct, 2017  Recurrent major depressive disorder, in partial remission 
F33.41 ; PTSD (post-traumatic stress disorder) F43.10 and ADHD (attention 
deficit hyperactivity disorder), inattentive type F90.0

 

 [facility]  [address] [address]
1107  13 Sep, 2017   

 

 [facility]  [address] [address]
5798  11 Aug, 2017   

 

 [facility]  [address] [address]
2250  14 2017   

 

 [facility]  [address] [address]
6764     

 

 [facility]  [address] [address]
4098    ADHD (attention deficit hyperactivity disorder) F90.9

 

 [facility]  [address] [address]    Recurrent major depressive disorder, in partial remission 
F33.41 ; SUMAYA (generalized anxiety disorder) F41.1 ; Chronic post-traumatic 
stress disorder (PTSD) F43.12 and ADHD (attention deficit hyperactivity disorder
) F90.9

 

 [facility]  [address] [address]-
9036  24 May, 2017   

 

 [facility]  [id number] [place] [address]-
5046  19 May, 2017   

 

 [facility]  [id number] [place] [address], [place] [address]-
4002     

 

 [facility]  [id number] [address]-
1020  30 Mar, 2017   

 

 [facility]  [id number] [place] [address]-
7211  28 Mar, 2017   

 

 [facility]  [id number] [address]-
4131  23 Mar, 2017   

 

 [facility]  [id number] [address]-
9796     

 

 [facility]  [address] [address]-
5204     

 

 [facility]  [id number] [address]-
7938    Recurrent major depressive disorder, in partial remission 
F33.41 ; PTSD (post-traumatic stress disorder) F43.10 and ADHD (attention 
deficit hyperactivity disorder) F90.9

 

 [facility]  [address] [address]-
3216  31 Oct, 2016   

 

 [facility]  [id number] [address]-
7132  31 Oct, 2016   

 

 [facility]  [id number] [address]-
6723  27 Oct, 2016   

 

 [facility]  [address] [address]-
8262  25 Oct, 2016   

 

 [facility]  [address] [address]-
2604  17 Oct, 2016   

 

 [facility]  [address] [address]-
0558  08 Sep, 2016  Bronchitis J40

 

 [facility]  [address] [place] [address]-
9274  08 Sep, 2016   

 

 [facility]  [address] [place] [address]-
2170  08 Sep, 2016   

 

 [facility]  [address] [address]-
3882  12 May, 2016   

 

 [facility]  [address] [place] [address]-
4891  04 May, 2016  GERD (gastroesophageal reflux disease) K21.9 and Birth 
control Z30.9

 

 [facility]  [id number] [address]-
2232    Routine gynecological examination V72.31 ; Pap test, as 
part of routine gynecological examination V76.2 ; Screen for STD (sexually 
transmitted disease) V74.5 ; Breast cancer screening V76.10 ; Colon cancer 
screening V76.51 and Oral contraceptive pill surveillance V25.41

 

 [facility]  [address] [address]-
2404  14 2015   

 

 [facility]  [address] [address]-
3221     

 

 [facility]  [address] [address]-
0985  30 Dec, 2014   

 

 [facility]  [address] [address]-
1971  30 Dec, 2014   

 

 [facility]  [address] [address]-
2766     

 

 [facility]  [address] [facility] [id number] [address]-
3845     

 

 [facility]  [id number] [address]-
2216     

 

 [facility]  [id number] [address]-
9774     

 

 [facility]  [address] [address]-
1276     

 

 [facility]  [id number] [address]-
7321     

 

 [facility]  [id number] [address]-
9095  30 Sep, 2014   

 

 [facility]  [id number] [address]-
8818  30 Sep, 2014   







IMMUNIZATIONS

No Known Immunizations



SOCIAL HISTORY

Never Assessed



REASON FOR VISIT

adderall 2017



PLAN OF CARE





VITAL SIGNS





MEDICATIONS







 Medication  Instructions  Dosage  Frequency  Start Date  End Date  Duration  
Status

 

 Adderall XR 20 mg  Orally Once a day for ADHD  1 Capsule          
28 days  Active

 

 Adderall XR 10 mg  Orally Once at 2pm for ADHD  1 capsule          
28 days  Active







RESULTS

No Results



PROCEDURES

No Known procedures



INSTRUCTIONS





MEDICATIONS ADMINISTERED

No Known Medications



MEDICAL (GENERAL) HISTORY







 Type  Description  Date

 

 Medical History  gastrointestinal disorder Hiatal Hernia   

 

 Medical History  Psychiatric disorders Depression , anxiety   

 

 Medical History  ADHD   

 

 Medical History  Cervical CA-with cone biopsy    

 

 Surgical History  sinus  surgery  

 

 Hospitalization History  Childhood panic - hospitalized for heart eval  1971

## 2018-07-10 NOTE — XMS REPORT
[facility]

 Created on: 2017



Josiah Evelin

External Reference #: [id number]

: 1960

Sex: Female



Demographics







 Address  [address]  [address]

 

 Preferred Language  Unknown

 

 Marital Status  Unknown

 

 Islam Affiliation  Unknown

 

 Race  Unknown

 

 Ethnic Group  Unknown





Author







 Author  KANE THOMPSON

 

 Organization  [facility]

 

 Address  [address] [place]  [address]



 

 Phone  [phone]







Care Team Providers







 Care Team Member Name  Role  Phone

 

 KANE THOMPSON  Unavailable  [phone]







PROBLEMS







 Type  Condition  ICD9-CM Code  PCB31-AK Code  Onset Dates  Condition Status  
SNOMED Code

 

 Problem  ADHD (attention deficit hyperactivity disorder)     F90.9     Active  
400040408

 

 Problem  GERD (gastroesophageal reflux disease)     K21.9     Active  360668619

 

 Problem  Depression     F32.9     Active  62812028

 

 Problem  Birth control     Z30.9     Active  33784063

 

 Problem  SUMAYA (generalized anxiety disorder)     F41.1     Active  97775211

 

 Problem  Chronic post-traumatic stress disorder (PTSD)     F43.12     Active  
019018526

 

 Problem  PTSD (post-traumatic stress disorder)     F43.10     Active  83815732

 

 Problem  Anxiety     F41.9     Active  78051569

 

 Problem  Recurrent major depressive disorder, in partial remission     F33.41 
    Active  05865477

 

 Problem  Major depression, chronic     F32.9     Active  295496909







ALLERGIES

No Information



SOCIAL HISTORY

Never Assessed



PLAN OF CARE





VITAL SIGNS





MEDICATIONS

Unknown Medications



RESULTS

No Results



PROCEDURES

No Known procedures



IMMUNIZATIONS

No Known Immunizations



MEDICAL (GENERAL) HISTORY







 Type  Description  Date

 

 Medical History  gastrointestinal disorder Hiatal Hernia   

 

 Medical History  Psychiatric disorders Depression , anxiety   

 

 Medical History  ADHD   

 

 Medical History  Cervical CA-with cone biopsy    

 

 Surgical History  sinus  surgery  

 

 Hospitalization History  Childhood panic - hospitalized for heart eval  1971